# Patient Record
Sex: FEMALE | Race: WHITE | NOT HISPANIC OR LATINO | Employment: OTHER | URBAN - METROPOLITAN AREA
[De-identification: names, ages, dates, MRNs, and addresses within clinical notes are randomized per-mention and may not be internally consistent; named-entity substitution may affect disease eponyms.]

---

## 2018-08-09 ENCOUNTER — OFFICE VISIT CONVERTED (OUTPATIENT)
Dept: PODIATRY | Facility: CLINIC | Age: 83
End: 2018-08-09
Attending: PODIATRIST

## 2019-01-25 ENCOUNTER — OFFICE VISIT (OUTPATIENT)
Dept: ORTHOPEDIC SURGERY | Facility: CLINIC | Age: 84
End: 2019-01-25

## 2019-01-25 VITALS — TEMPERATURE: 97.3 F | BODY MASS INDEX: 26.91 KG/M2 | HEIGHT: 64 IN | WEIGHT: 157.6 LBS

## 2019-01-25 DIAGNOSIS — S52.501A CLOSED FRACTURE OF DISTAL END OF RIGHT RADIUS, UNSPECIFIED FRACTURE MORPHOLOGY, INITIAL ENCOUNTER: ICD-10-CM

## 2019-01-25 PROCEDURE — 99213 OFFICE O/P EST LOW 20 MIN: CPT | Performed by: ORTHOPAEDIC SURGERY

## 2019-01-25 PROCEDURE — 25600 CLTX DST RDL FX/EPHYS SEP WO: CPT | Performed by: ORTHOPAEDIC SURGERY

## 2019-01-25 RX ORDER — MELOXICAM 7.5 MG/1
TABLET ORAL
COMMUNITY
Start: 2019-01-18 | End: 2020-06-05

## 2019-01-25 RX ORDER — ROSUVASTATIN CALCIUM 5 MG/1
TABLET, COATED ORAL
COMMUNITY
Start: 2019-01-08 | End: 2020-06-05

## 2019-01-25 RX ORDER — KETOROLAC TROMETHAMINE 10 MG/1
TABLET, FILM COATED ORAL
COMMUNITY
Start: 2019-01-25 | End: 2019-06-24

## 2019-01-25 NOTE — PROGRESS NOTES
Chief Complaint   Patient presents with   • Right Wrist - Pain             HPI  87 year old female presents with right wrist pain following a fall in the living room of her own home yesterday.  She was seen at [facility] and sent here for treatment of a fracture.  X-rays were performed there.  She has developed significant swelling and bruising on the dorsal aspect of the wrist.  She has difficulty with making a fist.  She does not have an obvious clinical deformity.  Skin and soft tissue swelling is consistent with a fracture of the distal radius.  There is no evidence of a compartmental syndrome.  She states that she has been using Tylenol for the pain which is constantly present.  She describes her pain as a constant dull ache which is made worse by movement of her fingers.  She has been diagnosed with a comminuted distal radius fracture and referred to our office for further management.          Allergies   Allergen Reactions   • Ciprofloxacin Rash   • Penicillins Rash         Social History     Socioeconomic History   • Marital status:      Spouse name: Not on file   • Number of children: Not on file   • Years of education: Not on file   • Highest education level: Not on file   Social Needs   • Financial resource strain: Not on file   • Food insecurity - worry: Not on file   • Food insecurity - inability: Not on file   • Transportation needs - medical: Not on file   • Transportation needs - non-medical: Not on file   Occupational History   • Not on file   Tobacco Use   • Smoking status: Never Smoker   Substance and Sexual Activity   • Alcohol use: No     Frequency: Never   • Drug use: Not on file   • Sexual activity: Not on file   Other Topics Concern   • Not on file   Social History Narrative   • Not on file       History reviewed. No pertinent family history.    Past Surgical History:   Procedure Laterality Date   • HERNIA REPAIR     • JOINT REPLACEMENT     • KNEE SURGERY Bilateral     knee replacement        History reviewed. No pertinent past medical history.        Vitals:    01/25/19 1048   Temp: 97.3 °F (36.3 °C)             Review of Systems        Physical Exam            Joint/Body Part Specific Exam:  right wrist. Patient is right hand dominant. The distal radius is swollen. Skin and soft tissues are bruised. There is some shortening of the distal radius compared to the distal ulna. There is an angular tilt with disorientation of the articular surface which is not pointed in its normal volar inclination. Length of the radius is reduced compared to the distal ulna. There is tenderness consistent with a fracture of the distal radial metaphysis. Wrist range of motion is significantly compromised because of pain swelling. The ulnar styloid process is also somewhat tender. There appears to be a soft tissue injury to the medial side of the wrist as well.     Capillary refill is 2 seconds with a brisk return. Radial artery pulses are palpable but slightly thin and thready, most likely due to swelling locally. Median nerve function is intact, but the patient does complain of some tingling and paresthesia along the median nerve distribution. Patient is unable to make a clenched fist and motion at the elbow bothers the patient significantly. Pain is 6 on a scale of 1-10.          X-RAY Report: X-ray reports from an outside facility are reviewed.  There is a fracture of the distal radius with intra-articular extension.  Slight shortening of the radius is noted with respect to the ulna.  There is osteopenia in the metaphyseal bone.  The decision to proceed with nonoperative care is based on the x-ray findings and the desires of the patient not to undergo surgery.            Diagnostics:            Macie was seen today for pain.    Diagnoses and all orders for this visit:    Closed fracture of distal end of right radius, unspecified fracture morphology, initial encounter            Procedures          I provided this  patient with educational materials regarding bone health and cast or splint care.        Plan: Operative versus nonoperative management of this fracture discussed with the patient.  She states that she is 87 years old and really does not want to undergo any form of surgical intervention if she can help it.    Elevation to control swelling.    Application of a short arm cast with the fingers in an intrinsic plus position.    Neurovascular checks.    Cast care.    Tablet Ultram 50 g tab 1 by mouth twice a day for pain swelling and discomfort.    tablet tylenol extra strength tab 1 by mouth every 6 intermittently for pain swelling and discomfort.    follow-up in my office in 4 weeks for removal of the cast and repeat the x-rays.    Controlled substance treatment options discussed in detail. Patient's signed consent to medical options on file. PUSHPA form in chart.  The patient is being provided this narcotic prescription to address the acute medical condition that they are undergoing/experiencing at this time.  It is my medical and surgical assessment that more than 3 days of narcotic medication is necessary to help control the pain and discomfort that this patient is experiencing at this point.  Risks of narcotic medication usage outlined.  Possibility of physical and psychological dependence and abuse, especially long term, emphasized to the patient.  I have explained to the patient that we will try to wean them off the narcotic medication as soon as possible and introduce non-narcotic modalities of pain control.  At this point in the patient's clinical spectrum, however, alternative available treatments are inadequate to control their pain and symptoms.  I have also discussed the possibility of random drug testing as well as pill counts to prevent misuse and misappropriation of the narcotic medications prescribed to the patient.          CC To Beth Lopez APRN

## 2019-01-28 NOTE — TELEPHONE ENCOUNTER
"PATIENT IS REQUESTING SOMETHING FOR PAIN. SHE STATES THAT IT DOES NOT EVEN HAVE TO BE A NARCOTIC IF YOU WOULD WANT TO GIVE HER A \"STRONG TYLENOL/IBUPROFEN\"  "

## 2019-01-29 RX ORDER — TRAMADOL HYDROCHLORIDE 50 MG/1
TABLET ORAL
Qty: 30 TABLET | Refills: 0 | OUTPATIENT
Start: 2019-01-29 | End: 2019-09-17

## 2019-01-29 NOTE — TELEPHONE ENCOUNTER
Rx has been called in. Waiting for Dr. Landaverde to electronically sign. Patient notified via voicemail.

## 2019-02-25 ENCOUNTER — OFFICE VISIT (OUTPATIENT)
Dept: ORTHOPEDIC SURGERY | Facility: CLINIC | Age: 84
End: 2019-02-25

## 2019-02-25 VITALS — TEMPERATURE: 97.1 F

## 2019-02-25 DIAGNOSIS — S52.501D CLOSED FRACTURE OF DISTAL END OF RIGHT RADIUS WITH ROUTINE HEALING, UNSPECIFIED FRACTURE MORPHOLOGY, SUBSEQUENT ENCOUNTER: Primary | ICD-10-CM

## 2019-02-25 PROCEDURE — 73100 X-RAY EXAM OF WRIST: CPT | Performed by: PHYSICIAN ASSISTANT

## 2019-02-25 PROCEDURE — 99024 POSTOP FOLLOW-UP VISIT: CPT | Performed by: PHYSICIAN ASSISTANT

## 2019-02-25 NOTE — PROGRESS NOTES
OTHER POST OP GLOBAL     Macie Miller:  ?  2/13/1931:  ?  Chief Complaint   Patient presents with   • Right Wrist - Follow-up   • Cast Removal     ?  HPI:   Patient returns today for 4 week(s) follow up of right Distal radius fracture. Patient reports doing well with no unusual complaints.  She denies pain in the extremity.  Appears to be progressing appropriately.  Patient is right-hand dominant. she does report pain in the left hip/low back region since her fall a month ago.  Her PCP ordered x-rays 5 days ago and she reports they were normal.       Review of Systems      Ortho Exam:   Right wrist. There is no clinical deformity at this point. The distal radius fracture appears to be appropriately stabilized. Neurovascular status is intact. Radial artery function is maintained. Capillary refill is 2 seconds with a brisk return. Median nerve function is well preserved. Length of the radius is appropriate compared to the distal ulna. There is no instability of the DRUJ.  There is decreased range of motion at the wrist joint.  Supination is 0-70 degrees.  Flexion is 0-55 degrees.  Pronation is 0-90 degrees.  The forearm muscles are soft and nontender. No evidence of RSD or any other untoward surgical complications are noted.    Capillary refill is 2 seconds with a brisk return. Radial artery pulses are palpable but slightly thin and thready, most likely due to swelling locally. Median nerve function is intact, but the patient does complain of some tingling and paresthesia along the median nerve distribution. Patient is unable to make a clenched fist and motion at the elbow bothers the patient significantly. Pain is 0 on a scale of 1-10.     Diagnostic Studies:  X-Ray Report:  Right wrist(s) X-Ray  Indication: Evaluation of healing distal radius fracture  AP, Lateral views  Findings: Healing distal radius fracture with calcium deposits over the fracture line  Bony lesion: no  Soft tissues: within normal  limits  Joint spaces: within normal limits  Hardware appropriately positioned: not applicable    Prior studies available for comparison: yes     X-RAY was ordered and reviewed by Jaiden Seo PA-C    Assessment:  Macie was seen today for cast removal and follow-up.    Diagnoses and all orders for this visit:    Closed fracture of distal end of right radius with routine healing, unspecified fracture morphology, subsequent encounter  -     XR Wrist 2 View Right          Plan   Cast removed and EXO brace applied  Continue ice as needed  Continue to work hand with a soft ball or play-eric  Alternate Ibuprofen and Tylenol as needed  Restrictions: No heavy lifting, pushing or pulling  Follow up in 4 week(s) with Xrays of: wrist.  If her left hip area is still bothersome at next visit we could look at possibly doing an injection of steroid.  I explained that we could not do that today because she is still too early in the healing process with the radial fracture and administration of steroid would slow bone healing.        Jaiden Seo PA-C  2/25/2019

## 2019-03-26 ENCOUNTER — OFFICE VISIT (OUTPATIENT)
Dept: ORTHOPEDIC SURGERY | Facility: CLINIC | Age: 84
End: 2019-03-26

## 2019-03-26 VITALS — HEIGHT: 63 IN | TEMPERATURE: 96.8 F | WEIGHT: 156 LBS | BODY MASS INDEX: 27.64 KG/M2

## 2019-03-26 DIAGNOSIS — S52.521D CLOSED TORUS FRACTURE OF DISTAL END OF RIGHT RADIUS WITH ROUTINE HEALING, SUBSEQUENT ENCOUNTER: Primary | ICD-10-CM

## 2019-03-26 DIAGNOSIS — S79.912A HIP INJURY, LEFT, INITIAL ENCOUNTER: ICD-10-CM

## 2019-03-26 PROCEDURE — 99024 POSTOP FOLLOW-UP VISIT: CPT | Performed by: ORTHOPAEDIC SURGERY

## 2019-03-26 PROCEDURE — 73502 X-RAY EXAM HIP UNI 2-3 VIEWS: CPT | Performed by: ORTHOPAEDIC SURGERY

## 2019-03-26 PROCEDURE — 73100 X-RAY EXAM OF WRIST: CPT | Performed by: ORTHOPAEDIC SURGERY

## 2019-04-13 PROBLEM — S79.912A HIP INJURY, LEFT, INITIAL ENCOUNTER: Status: ACTIVE | Noted: 2019-04-13

## 2019-05-02 ENCOUNTER — OFFICE VISIT (OUTPATIENT)
Dept: ORTHOPEDIC SURGERY | Facility: CLINIC | Age: 84
End: 2019-05-02

## 2019-05-02 DIAGNOSIS — G56.01 CARPAL TUNNEL SYNDROME OF RIGHT WRIST: ICD-10-CM

## 2019-05-02 DIAGNOSIS — S62.101D CLOSED FRACTURE OF RIGHT WRIST WITH ROUTINE HEALING, SUBSEQUENT ENCOUNTER: Primary | ICD-10-CM

## 2019-05-02 DIAGNOSIS — M18.11 ARTHRITIS OF CARPOMETACARPAL (CMC) JOINT OF RIGHT THUMB: ICD-10-CM

## 2019-05-02 PROCEDURE — 20600 DRAIN/INJ JOINT/BURSA W/O US: CPT | Performed by: ORTHOPAEDIC SURGERY

## 2019-05-02 PROCEDURE — 73100 X-RAY EXAM OF WRIST: CPT | Performed by: ORTHOPAEDIC SURGERY

## 2019-05-02 PROCEDURE — 99213 OFFICE O/P EST LOW 20 MIN: CPT | Performed by: ORTHOPAEDIC SURGERY

## 2019-05-02 RX ADMIN — METHYLPREDNISOLONE ACETATE 80 MG: 80 INJECTION, SUSPENSION INTRA-ARTICULAR; INTRALESIONAL; INTRAMUSCULAR; SOFT TISSUE at 10:09

## 2019-05-02 RX ADMIN — LIDOCAINE HYDROCHLORIDE 1 ML: 10 INJECTION, SOLUTION INFILTRATION; PERINEURAL at 10:09

## 2019-05-02 NOTE — PROGRESS NOTES
FOLLOW UP VISIT    Patient: Macie Miller  ?  YOB: 1931  ?  Chief Complaint   Patient presents with   • Right Wrist - Follow-up      ?  HPI: Patient is following up on a right distal radius fracture which has healed nicely.  She has some numbness and tingling related to median nerve function.  She states that her right thumb is hurting her very significantly.  She has advanced amounts of CMC joint arthritis at the base of the thumb.  Pain Location: ..Base of the right thumb over the CMC joint.  Radiation: none  Quality: sharp and stabbing  Intensity/Severity: severe  Duration: For the past 2 to 3 weeks.  Onset quality: sudden  Timing: intermittent  Aggravating Factors: Active mobilization of the thumb  Alleviating Factors: Resting the thumb and using a brace.  Previous Episodes: none mentioned  Associated Symptoms: pain, swelling, decreased ROM  Previous Treatment: Anti-inflammatory medication.    This patient is an established patient.  This problem is not new to this examiner.      Allergies:   Allergies   Allergen Reactions   • Ciprofloxacin Rash   • Penicillins Rash       Medications:   Home Medications:  Current Outpatient Medications on File Prior to Visit   Medication Sig   • ketorolac (TORADOL) 10 MG tablet    • meloxicam (MOBIC) 7.5 MG tablet    • rosuvastatin (CRESTOR) 5 MG tablet    • traMADol (ULTRAM) 50 MG tablet Take one tablet by mouth every 8-12 hours prn pain     No current facility-administered medications on file prior to visit.      Current Medications:  Scheduled Meds:  PRN Meds:.    I have reviewed the patient's medical history in detail and updated the computerized patient record.  Review and summarization of old records include:    History reviewed. No pertinent past medical history.  Past Surgical History:   Procedure Laterality Date   • HERNIA REPAIR     • JOINT REPLACEMENT     • KNEE SURGERY Bilateral     knee replacement     Social History     Occupational History   •  "Not on file   Tobacco Use   • Smoking status: Never Smoker   Substance and Sexual Activity   • Alcohol use: No     Frequency: Never   • Drug use: Not on file   • Sexual activity: Not on file      Social History     Social History Narrative   • Not on file     History reviewed. No pertinent family history.      Review of Systems   Constitutional: Negative.    HENT: Negative.    Eyes: Negative.    Respiratory: Negative.    Cardiovascular: Negative.    Gastrointestinal: Negative.    Endocrine: Negative.    Genitourinary: Negative.    Musculoskeletal: Positive for joint swelling.   Skin: Negative.    Allergic/Immunologic: Negative.    Neurological: Negative.    Hematological: Negative.    Psychiatric/Behavioral: Negative.           Wt Readings from Last 3 Encounters:   03/26/19 70.8 kg (156 lb)   01/25/19 71.5 kg (157 lb 9.6 oz)     Ht Readings from Last 3 Encounters:   03/26/19 160 cm (63\")   01/25/19 161.3 cm (63.5\")     There is no height or weight on file to calculate BMI.  No height and weight on file for this encounter.  There were no vitals filed for this visit.      Physical Exam   Constitutional: Patient is oriented to person, place, and time. Appears well-developed and well-nourished.   HENT:   Head: Normocephalic and atraumatic.   Eyes: Conjunctivae and EOM are normal. Pupils are equal, round, and reactive to light.   Cardiovascular: Normal rate, regular rhythm, normal heart sounds and intact distal pulses.   Pulmonary/Chest: Effort normal and breath sounds normal.   Musculoskeletal:   See detailed exam below   Neurological: Alert and oriented to person, place, and time. No sensory deficit. Coordination normal.   Skin: Skin is warm and dry. Capillary refill takes less than 2 seconds. No rash noted. No erythema.   Psychiatric: Patient has a normal mood and affect. Her behavior is normal. Judgment and thought content normal.   Nursing note and vitals reviewed.    Ortho Exam:   Right wrist-carpal tunnel. The " patient is right hand dominant. The Skin is mildly swollen volarly over the proximal crease of the wrist. Radial pulse is palpable. Capillary refill is 2 seconds with a brisk return. Positive Tinel's sign at the wrist. Positive Phalen's sign at the wrist .  strength is impaired.  There is no muscle wasting or atrophy specifically involving the thenar muscle group.  Sensation to light touch and pinprick are diminished over the thumb, index and middle fingers.  Flexor and extensor tendon functions are well preserved. Moving 2 point discrimination is prolonged to 12mm over the median nerve distribution. No evidence of RSD.  There is no clinical evidence of renal disease, thyroid disease or any generalized neurological condition that could be causing nerve dysfunction.    Right hand. Tenderness is present at base of the 1st metacarpal. Skin and soft tissues are mildly swollen. Flexor and extensor tendon function is well preserved. Capillary refill is 2 seconds with a brisk return. Axial lading of the joint causes crepitus and pain. Pinch strength is compromised. Slight subluxation of the 1st metacarpal base is noted. Neurovascular status is intact.      Diagnostics:right Wrist X-Ray  Indication: Evaluation of healing of a distal radius fracture along with the base of the thumb.  AP, Lateral views  Findings: Advanced degenerative change over the CMC joint of the thumb and a healed distal radius fracture.  no bony lesion  Soft tissues within normal limits  decreased joint spaces  Hardware appropriately positioned not applicable      yes prior studies available for comparison.    X-RAY was ordered and reviewed by Johnathan Landaverde MD       Assessment:  Macie was seen today for follow-up.    Diagnoses and all orders for this visit:    Closed fracture of right wrist with routine healing, subsequent encounter  -     XR Wrist 2 View Right    Arthritis of carpometacarpal (CMC) joint of right thumb  -     Small Joint  Arthrocentesis: R thumb CMC    Carpal tunnel syndrome of right wrist          Small Joint Arthrocentesis: R thumb CMC  Consent given by: patient  Site marked: site marked  Timeout: Immediately prior to procedure a time out was called to verify the correct patient, procedure, equipment, support staff and site/side marked as required   Supporting Documentation  Indications: pain   Procedure Details  Location: thumb - R thumb CMC  Preparation: Patient was prepped and draped in the usual sterile fashion  Needle size: 27 G  Medications administered: 1 mL lidocaine 1 %; 80 mg methylPREDNISolone acetate 80 MG/ML  Patient tolerance: patient tolerated the procedure well with no immediate complications        ?    Plan  Injected patient's right thumb with a steroid over the dorsal aspect of the CMC joint.  · Compression/brace  · Rest, ice, compression, and elevation (RICE) therapy  · Stretching and strengthening exercises of the wrist joint.  · Use a supportive brace to minimize pressure over the median nerve and to decrease the symptoms of carpal tunnel compression.  · Discussed with the patient and her daughter that we would like to get an EMG/nerve conduction study to see the extent of damage to the median nerve.  She might require surgical decompression of this nerve.  · Falls precautions.  · Calcium and vitamin D for bone health.  · Avoid repetitive loading and lifting with this wrist to minimize the pressure over the median nerve.  · Vitamin B6, B12 100 mcg tab 1 p.o. daily for nerve function improvement.  · OTC Tylenol 1000mg by mouth every 4-6 hours as needed for pain   · Follow up in 3 month(s)    Johnathan Landaverde MD  05/02/2019

## 2019-05-14 PROBLEM — G56.01 CARPAL TUNNEL SYNDROME OF RIGHT WRIST: Status: ACTIVE | Noted: 2019-05-14

## 2019-05-14 PROBLEM — M18.11 ARTHRITIS OF CARPOMETACARPAL (CMC) JOINT OF RIGHT THUMB: Status: ACTIVE | Noted: 2019-05-14

## 2019-05-14 PROBLEM — S62.101A CLOSED FRACTURE OF RIGHT WRIST: Status: ACTIVE | Noted: 2019-05-14

## 2019-05-14 RX ORDER — METHYLPREDNISOLONE ACETATE 80 MG/ML
80 INJECTION, SUSPENSION INTRA-ARTICULAR; INTRALESIONAL; INTRAMUSCULAR; SOFT TISSUE
Status: COMPLETED | OUTPATIENT
Start: 2019-05-02 | End: 2019-05-02

## 2019-05-14 RX ORDER — LIDOCAINE HYDROCHLORIDE 10 MG/ML
1 INJECTION, SOLUTION INFILTRATION; PERINEURAL
Status: COMPLETED | OUTPATIENT
Start: 2019-05-02 | End: 2019-05-02

## 2019-06-24 ENCOUNTER — OFFICE VISIT (OUTPATIENT)
Dept: ORTHOPEDIC SURGERY | Facility: CLINIC | Age: 84
End: 2019-06-24

## 2019-06-24 VITALS — BODY MASS INDEX: 27.61 KG/M2 | WEIGHT: 155.8 LBS | TEMPERATURE: 97.8 F | HEIGHT: 63 IN

## 2019-06-24 DIAGNOSIS — S62.356A CLOSED NONDISPLACED FRACTURE OF SHAFT OF FIFTH METACARPAL BONE OF RIGHT HAND, INITIAL ENCOUNTER: ICD-10-CM

## 2019-06-24 DIAGNOSIS — S69.91XA INJURY OF RIGHT WRIST, INITIAL ENCOUNTER: Primary | ICD-10-CM

## 2019-06-24 DIAGNOSIS — Z78.0 POSTMENOPAUSAL: ICD-10-CM

## 2019-06-24 DIAGNOSIS — S62.304A CLOSED NONDISPLACED FRACTURE OF FOURTH METACARPAL BONE OF RIGHT HAND, UNSPECIFIED PORTION OF METACARPAL, INITIAL ENCOUNTER: ICD-10-CM

## 2019-06-24 DIAGNOSIS — S62.101D CLOSED FRACTURE OF RIGHT WRIST WITH ROUTINE HEALING, SUBSEQUENT ENCOUNTER: ICD-10-CM

## 2019-06-24 PROBLEM — S62.309A: Status: ACTIVE | Noted: 2019-06-24

## 2019-06-24 PROCEDURE — 99214 OFFICE O/P EST MOD 30 MIN: CPT | Performed by: PHYSICIAN ASSISTANT

## 2019-06-24 PROCEDURE — 26740 TREAT FINGER FRACTURE EACH: CPT | Performed by: PHYSICIAN ASSISTANT

## 2019-06-24 PROCEDURE — 73100 X-RAY EXAM OF WRIST: CPT | Performed by: PHYSICIAN ASSISTANT

## 2019-06-24 PROCEDURE — 26600 TREAT METACARPAL FRACTURE: CPT | Performed by: PHYSICIAN ASSISTANT

## 2019-06-24 RX ORDER — MONTELUKAST SODIUM 10 MG/1
TABLET ORAL
COMMUNITY
Start: 2019-06-03 | End: 2020-06-05

## 2019-06-24 RX ORDER — PRAVASTATIN SODIUM 20 MG
TABLET ORAL
COMMUNITY
End: 2020-06-05

## 2019-06-24 RX ORDER — HYDROCHLOROTHIAZIDE 25 MG/1
12.5 TABLET ORAL DAILY
Refills: 1 | COMMUNITY
Start: 2019-05-21 | End: 2020-06-05

## 2019-06-24 RX ORDER — ESCITALOPRAM OXALATE 10 MG/1
TABLET ORAL
COMMUNITY
End: 2020-06-05

## 2019-06-24 RX ORDER — TRAZODONE HYDROCHLORIDE 50 MG/1
TABLET ORAL
COMMUNITY

## 2019-06-24 RX ORDER — LIDOCAINE 50 MG/G
OINTMENT TOPICAL
COMMUNITY
End: 2020-06-05

## 2019-06-24 RX ORDER — EPINEPHRINE 0.3 MG/.3ML
INJECTION SUBCUTANEOUS
COMMUNITY
End: 2020-06-05

## 2019-06-24 RX ORDER — TRIAMCINOLONE ACETONIDE 1 MG/G
CREAM TOPICAL
COMMUNITY

## 2019-06-24 RX ORDER — DICLOFENAC SODIUM 75 MG/1
TABLET, DELAYED RELEASE ORAL
COMMUNITY
End: 2020-06-05

## 2019-06-24 RX ORDER — DOXAZOSIN 8 MG/1
8 TABLET ORAL DAILY
Refills: 1 | COMMUNITY
Start: 2019-05-21 | End: 2020-06-05

## 2019-06-24 RX ORDER — METFORMIN HYDROCHLORIDE 500 MG/1
TABLET, EXTENDED RELEASE ORAL
COMMUNITY
Start: 2019-04-03 | End: 2020-06-05

## 2019-06-24 RX ORDER — ALBUTEROL SULFATE 90 UG/1
AEROSOL, METERED RESPIRATORY (INHALATION)
COMMUNITY
End: 2020-06-05

## 2019-07-09 DIAGNOSIS — Z78.0 POSTMENOPAUSAL: ICD-10-CM

## 2019-07-09 DIAGNOSIS — S62.101D CLOSED FRACTURE OF RIGHT WRIST WITH ROUTINE HEALING, SUBSEQUENT ENCOUNTER: ICD-10-CM

## 2019-07-09 DIAGNOSIS — S62.309A MULTIPLE CLOSED FRACTURES OF SINGLE METACARPAL BONE, INITIAL ENCOUNTER: ICD-10-CM

## 2019-07-19 ENCOUNTER — OFFICE VISIT (OUTPATIENT)
Dept: ORTHOPEDIC SURGERY | Facility: CLINIC | Age: 84
End: 2019-07-19

## 2019-07-19 DIAGNOSIS — S62.642D CLOSED NONDISPLACED FRACTURE OF PROXIMAL PHALANX OF RIGHT MIDDLE FINGER WITH ROUTINE HEALING, SUBSEQUENT ENCOUNTER: ICD-10-CM

## 2019-07-19 DIAGNOSIS — M80.00XA OSTEOPOROSIS WITH CURRENT PATHOLOGICAL FRACTURE, UNSPECIFIED OSTEOPOROSIS TYPE, INITIAL ENCOUNTER: ICD-10-CM

## 2019-07-19 DIAGNOSIS — S62.304A CLOSED NONDISPLACED FRACTURE OF FOURTH METACARPAL BONE OF RIGHT HAND, UNSPECIFIED PORTION OF METACARPAL, INITIAL ENCOUNTER: Primary | ICD-10-CM

## 2019-07-19 DIAGNOSIS — S62.309D: ICD-10-CM

## 2019-07-19 DIAGNOSIS — S62.356D CLOSED NONDISPLACED FRACTURE OF SHAFT OF FIFTH METACARPAL BONE OF RIGHT HAND WITH ROUTINE HEALING, SUBSEQUENT ENCOUNTER: ICD-10-CM

## 2019-07-19 DIAGNOSIS — Z51.81 MEDICATION MONITORING ENCOUNTER: ICD-10-CM

## 2019-07-19 PROBLEM — S62.642A CLOSED NONDISPLACED FRACTURE OF PROXIMAL PHALANX OF RIGHT MIDDLE FINGER: Status: ACTIVE | Noted: 2019-07-19

## 2019-07-19 PROBLEM — S62.356A CLOSED NONDISPLACED FRACTURE OF SHAFT OF FIFTH METACARPAL BONE OF RIGHT HAND: Status: ACTIVE | Noted: 2019-07-19

## 2019-07-19 PROCEDURE — 99024 POSTOP FOLLOW-UP VISIT: CPT | Performed by: PHYSICIAN ASSISTANT

## 2019-07-19 PROCEDURE — 73120 X-RAY EXAM OF HAND: CPT | Performed by: PHYSICIAN ASSISTANT

## 2019-07-19 NOTE — PROGRESS NOTES
FOLLOW UP VISIT    Patient: Macie Miller  ?  YOB: 1931    MRN: [id number]  ?  Chief Complaint   Patient presents with   • Right Wrist - Follow-up   • Cast Removal      ?  HPI:   Patient returns today for 4 week(s) follow up of right Intra-articular avulsion fracture at proximal third phalanx, distal end of fourth metacarpal fracture and fracture of fifth midshaft metacarpal. Patient reports doing well with no unusual complaints. Appears to be progressing appropriately.  She does report an increase in bruising as well as stiffness in her right hand.  Patient also presents today for follow-up of DEXA scan results.    This patient is an established patient.  This problem is not new to this examiner.      Allergies:   Allergies   Allergen Reactions   • Ciprofloxacin Rash   • Penicillins Rash       Medications:   Home Medications:  Current Outpatient Medications on File Prior to Visit   Medication Sig   • albuterol sulfate HFA (VENTOLIN HFA) 108 (90 Base) MCG/ACT inhaler Ventolin HFA 90 mcg/actuation aerosol inhaler   • diclofenac (VOLTAREN) 75 MG EC tablet diclofenac sodium 75 mg tablet,delayed release   • doxazosin (CARDURA) 8 MG tablet Take 8 mg by mouth Daily.   • EPINEPHrine (EPIPEN) 0.3 MG/0.3ML solution auto-injector injection epinephrine 0.3 mg/0.3 mL injection, auto-injector   • escitalopram (LEXAPRO) 10 MG tablet escitalopram 10 mg tablet   • Fluticasone Furoate-Vilanterol (BREO ELLIPTA) 200-25 MCG/INH inhaler Breo Ellipta 200 mcg-25 mcg/dose powder for inhalation   • hydrochlorothiazide (HYDRODIURIL) 25 MG tablet Take 12.5 mg by mouth Daily.   • lidocaine (XYLOCAINE) 5 % ointment lidocaine 5 % topical ointment   APPLY TO AFFECTED AREA(S) BY TOPICAL ROUTE 1-4 TIMES DAILY AS NEEDED   • meloxicam (MOBIC) 7.5 MG tablet    • metFORMIN ER (GLUCOPHAGE-XR) 500 MG 24 hr tablet    • montelukast (SINGULAIR) 10 MG tablet    • POLYETHYLENE GLYCOL 3350 PO polyethylene glycol 3350 17 gram/dose oral  "powder   • pravastatin (PRAVACHOL) 20 MG tablet pravastatin 20 mg tablet   • PROCTOZONE-HC 2.5 % rectal cream USE AS NEEDED FOR HEMORRHOIDS   • rosuvastatin (CRESTOR) 5 MG tablet    • sertraline (ZOLOFT) 50 MG tablet sertraline 50 mg tablet   • traMADol (ULTRAM) 50 MG tablet Take one tablet by mouth every 8-12 hours prn pain   • traZODone (DESYREL) 50 MG tablet trazodone 50 mg tablet   • triamcinolone (KENALOG) 0.1 % cream triamcinolone acetonide 0.1 % topical cream     No current facility-administered medications on file prior to visit.      Current Medications:  Scheduled Meds:  PRN Meds:.    I have reviewed the patient's medical history in detail and updated the computerized patient record.  Review and summarization of old records include:    History reviewed. No pertinent past medical history.  Past Surgical History:   Procedure Laterality Date   • HERNIA REPAIR     • JOINT REPLACEMENT     • KNEE SURGERY Bilateral     knee replacement     Social History     Occupational History   • Not on file   Tobacco Use   • Smoking status: Never Smoker   Substance and Sexual Activity   • Alcohol use: No     Frequency: Never   • Drug use: Not on file   • Sexual activity: Not on file      Social History     Social History Narrative   • Not on file     History reviewed. No pertinent family history.      Review of Systems   Constitutional: Negative.    Eyes: Negative.    Respiratory: Negative.    Cardiovascular: Negative.    Endocrine: Negative.    Musculoskeletal: Positive for arthralgias and joint swelling.   Skin: Positive for color change.   Allergic/Immunologic: Negative.    Neurological: Negative.    Hematological: Negative.    Psychiatric/Behavioral: Negative.           Wt Readings from Last 3 Encounters:   06/24/19 70.7 kg (155 lb 12.8 oz)   03/26/19 70.8 kg (156 lb)   01/25/19 71.5 kg (157 lb 9.6 oz)     Ht Readings from Last 3 Encounters:   06/24/19 160 cm (63\")   03/26/19 160 cm (63\")   01/25/19 161.3 cm (63.5\") "     There is no height or weight on file to calculate BMI.  No height and weight on file for this encounter.  There were no vitals filed for this visit.      Physical Exam  Constitutional: Patient is oriented to person, place, and time. Appears well-developed and well-nourished.   HENT:   Head: Normocephalic and atraumatic.   Eyes: Conjunctivae and EOM are normal. Pupils are equal, round, and reactive to light.   Cardiovascular: Normal rate, regular rhythm, normal heart sounds and intact distal pulses.   Pulmonary/Chest: Effort normal and breath sounds normal.   Musculoskeletal:   See detailed exam below   Neurological: Alert and oriented to person, place, and time. No sensory deficit. Coordination normal.   Skin: Skin is warm and dry. Capillary refill takes less than 2 seconds. No rash noted. No erythema. There is significant bruising on the palmar aspect of the right hand over the fractured areas.  Psychiatric: Patient has a normal mood and affect. Her behavior is normal. Judgment and thought content normal.   Nursing note and vitals reviewed.      Ortho Exam:   Right hand-metacarpal fracture. Patient is right dominant. There is a significant amount of soft tissue swelling both on the dorsal and volar aspects of the 3rd, 4th and 5th metacarpals. There is slight apex dorsal angulation over the 5th metcarpal with tenderness appropriate for the fracture. There is minimal tenderness over the MCP joints of the 3rd and 4th phalanges. Skin and soft tissue are swollen but much improved from last visit. Capillary refill is 2 seconds with a brisk return. Cascade of the fingers is fairly well preserved. Patient is unable to make a fist because of stiffness caused by being in a cast. There is no evidence of a compartmental syndrome. Skin and soft tissue envelop is clocked but essentially bruised. There is no crossover deformity of the digit distally.      Diagnostics:  X-Ray Report:  Right Hand X-Ray  Indication: Evaluation  of healing of multiple fractures  AP, Lateral views  Findings: Appropriate healing and callus formation of right proximal third phalanx, fracture of distal end of fourth metacarpal and fifth midshaft metacarpal fracture  Bony lesion: no  Soft tissues: increased  Joint spaces: within normal limits  Hardware appropriately positioned: not applicable  Prior studies available for comparison: yes   X-RAY was ordered and reviewed by Jaiden Seo PA-C      Assessment:  Macie was seen today for cast removal and follow-up.    Diagnoses and all orders for this visit:    Closed nondisplaced fracture of fourth metacarpal bone of right hand, unspecified portion of metacarpal, initial encounter  -     Ambulatory Referral to Occupational Therapy  -     Romosozumab-aqqg (EVENITY) 105 MG/1.17ML solution prefilled syringe; Inject 210 mg under the skin into the appropriate area as directed Every 30 (Thirty) Days for 336 days.    Multiple closed fractures of metacarpal bones, with routine healing, subsequent encounter  -     XR Hand 2 View Right  -     Romosozumab-aqqg (EVENITY) 105 MG/1.17ML solution prefilled syringe; Inject 210 mg under the skin into the appropriate area as directed Every 30 (Thirty) Days for 336 days.    Closed nondisplaced fracture of shaft of fifth metacarpal bone of right hand with routine healing, subsequent encounter  -     Ambulatory Referral to Occupational Therapy  -     Romosozumab-aqqg (EVENITY) 105 MG/1.17ML solution prefilled syringe; Inject 210 mg under the skin into the appropriate area as directed Every 30 (Thirty) Days for 336 days.    Closed nondisplaced fracture of proximal phalanx of right middle finger with routine healing, subsequent encounter  -     Ambulatory Referral to Occupational Therapy  -     Romosozumab-aqqg (EVENITY) 105 MG/1.17ML solution prefilled syringe; Inject 210 mg under the skin into the appropriate area as directed Every 30 (Thirty) Days for 336 days.    Osteoporosis  with current pathological fracture, unspecified osteoporosis type, initial encounter  -     Romosozumab-aqqg (EVENITY) 105 MG/1.17ML solution prefilled syringe; Inject 210 mg under the skin into the appropriate area as directed Every 30 (Thirty) Days for 336 days.  -     Calcium; Future    Medication monitoring encounter  -     Calcium; Future      ?    Plan    · Cast was removed today and patient applied a carpal tunnel brace per the instruction of Dr. Landaverde  · Patient is to begin occupational therapy  · Rest, ice, compression, and elevation (RICE) therapy  · Stretching and strengthening exercises  · OTC Tylenol 500-1000mg by mouth every 6 hours as needed for pain   · Follow up in 4 week(s)  · Discussed results of DEXA scan with patient and her daughter as well as treatment options.  I advised patient I would like to start her on the monthly subcutaneous injections of Evenity for the osteoporosis. I explained to patient and daughter that although her DEXA scan reads that she has osteopenia, she will actually have a diagnosis of osteoporosis due to the dexa scan combined with 2 fragility fractures occurring this year. Patient and family verbalized understanding and patient agrees to do the treatment I am recommended.   · Time spent with patient: 12 minutes face-to-face with greater than 50% spent in counseling and coordination of care discussing patient's dexa scan results and treatment options for osteoporosis.    Date of encounter: 07/19/2019   Jaiden Seo PA-C

## 2019-07-29 ENCOUNTER — TELEPHONE (OUTPATIENT)
Dept: ORTHOPEDIC SURGERY | Facility: CLINIC | Age: 84
End: 2019-07-29

## 2019-07-29 DIAGNOSIS — S62.642D CLOSED NONDISPLACED FRACTURE OF PROXIMAL PHALANX OF RIGHT MIDDLE FINGER WITH ROUTINE HEALING, SUBSEQUENT ENCOUNTER: Primary | ICD-10-CM

## 2019-07-29 DIAGNOSIS — S62.356D CLOSED NONDISPLACED FRACTURE OF SHAFT OF FIFTH METACARPAL BONE OF RIGHT HAND WITH ROUTINE HEALING, SUBSEQUENT ENCOUNTER: ICD-10-CM

## 2019-07-29 NOTE — TELEPHONE ENCOUNTER
CALI WITH [facility] CALLED REQUESTING A PT ORDER FOR PATIENT.  PATIENT IS BEING TREATED FOR RIGHT 3RD & 5TH METACARPAL FRACTURES.  PLEASE FAX ORDER -[phone]

## 2019-08-22 ENCOUNTER — OFFICE VISIT (OUTPATIENT)
Dept: ORTHOPEDIC SURGERY | Facility: CLINIC | Age: 84
End: 2019-08-22

## 2019-08-22 VITALS — HEIGHT: 63 IN | TEMPERATURE: 98 F | WEIGHT: 157 LBS | BODY MASS INDEX: 27.82 KG/M2

## 2019-08-22 DIAGNOSIS — G56.01 CARPAL TUNNEL SYNDROME OF RIGHT WRIST: ICD-10-CM

## 2019-08-22 DIAGNOSIS — S62.356D CLOSED NONDISPLACED FRACTURE OF SHAFT OF FIFTH METACARPAL BONE OF RIGHT HAND WITH ROUTINE HEALING, SUBSEQUENT ENCOUNTER: Primary | ICD-10-CM

## 2019-08-22 DIAGNOSIS — S62.304D CLOSED NONDISPLACED FRACTURE OF FOURTH METACARPAL BONE OF RIGHT HAND WITH ROUTINE HEALING, UNSPECIFIED PORTION OF METACARPAL, SUBSEQUENT ENCOUNTER: ICD-10-CM

## 2019-08-22 PROCEDURE — 73120 X-RAY EXAM OF HAND: CPT | Performed by: ORTHOPAEDIC SURGERY

## 2019-08-22 PROCEDURE — 99213 OFFICE O/P EST LOW 20 MIN: CPT | Performed by: ORTHOPAEDIC SURGERY

## 2019-09-13 ENCOUNTER — OUTSIDE FACILITY SERVICE (OUTPATIENT)
Dept: ORTHOPEDIC SURGERY | Facility: CLINIC | Age: 84
End: 2019-09-13

## 2019-09-13 PROCEDURE — 64721 CARPAL TUNNEL SURGERY: CPT | Performed by: ORTHOPAEDIC SURGERY

## 2019-09-13 RX ORDER — HYDROCODONE BITARTRATE AND ACETAMINOPHEN 5; 325 MG/1; MG/1
1 TABLET ORAL EVERY 12 HOURS PRN
Qty: 20 TABLET | Refills: 0 | Status: SHIPPED | OUTPATIENT
Start: 2019-09-13 | End: 2019-10-21

## 2019-09-16 DIAGNOSIS — M80.00XA OSTEOPOROSIS WITH CURRENT PATHOLOGICAL FRACTURE, UNSPECIFIED OSTEOPOROSIS TYPE, INITIAL ENCOUNTER: ICD-10-CM

## 2019-09-16 DIAGNOSIS — S62.356D CLOSED NONDISPLACED FRACTURE OF SHAFT OF FIFTH METACARPAL BONE OF RIGHT HAND WITH ROUTINE HEALING, SUBSEQUENT ENCOUNTER: ICD-10-CM

## 2019-09-16 DIAGNOSIS — S62.309D: ICD-10-CM

## 2019-09-16 DIAGNOSIS — S62.304A CLOSED NONDISPLACED FRACTURE OF FOURTH METACARPAL BONE OF RIGHT HAND, UNSPECIFIED PORTION OF METACARPAL, INITIAL ENCOUNTER: ICD-10-CM

## 2019-09-16 DIAGNOSIS — S62.642D CLOSED NONDISPLACED FRACTURE OF PROXIMAL PHALANX OF RIGHT MIDDLE FINGER WITH ROUTINE HEALING, SUBSEQUENT ENCOUNTER: ICD-10-CM

## 2019-09-16 NOTE — TELEPHONE ENCOUNTER
PATIENT'S DAUGHTER, MATT, CALLED AND STATED THAT PATIENT IS IN QUITE A BIT OF PAIN.  SHE STATED THAT PATIENT IS TAKING THE HYDROCODONE 1 IN THE MORNING AND 1 BEFORE BED.  PATIENT IS TAKING IBUPROFEN AND TYLENOL THROUGHOUT THE DAY AND NOTHING SEEMS TO BE HELPING.  PATIENT IS S/P RIGHT CARPAL TUNNEL RELEASE ON 9/13/19.  PLEASE ADVISE

## 2019-09-17 DIAGNOSIS — M80.00XA OSTEOPOROSIS WITH CURRENT PATHOLOGICAL FRACTURE, UNSPECIFIED OSTEOPOROSIS TYPE, INITIAL ENCOUNTER: ICD-10-CM

## 2019-09-17 DIAGNOSIS — S62.304A CLOSED NONDISPLACED FRACTURE OF FOURTH METACARPAL BONE OF RIGHT HAND, UNSPECIFIED PORTION OF METACARPAL, INITIAL ENCOUNTER: ICD-10-CM

## 2019-09-17 DIAGNOSIS — S62.309D: ICD-10-CM

## 2019-09-17 DIAGNOSIS — S62.642D CLOSED NONDISPLACED FRACTURE OF PROXIMAL PHALANX OF RIGHT MIDDLE FINGER WITH ROUTINE HEALING, SUBSEQUENT ENCOUNTER: ICD-10-CM

## 2019-09-17 DIAGNOSIS — S62.356D CLOSED NONDISPLACED FRACTURE OF SHAFT OF FIFTH METACARPAL BONE OF RIGHT HAND WITH ROUTINE HEALING, SUBSEQUENT ENCOUNTER: ICD-10-CM

## 2019-09-17 RX ORDER — TRAMADOL HYDROCHLORIDE 50 MG/1
50 TABLET ORAL EVERY 12 HOURS PRN
Qty: 30 TABLET | Refills: 0 | OUTPATIENT
Start: 2019-09-17

## 2019-09-17 NOTE — TELEPHONE ENCOUNTER
Please have the family loosen up the dressing, apply ice locally and we can call in some Ultram 50 mg tab 1 p.o. every 12 in addition to the Norco.  Please send me the prescription electronically and we can call in 30 pills of Ultram thank you

## 2019-09-17 NOTE — TELEPHONE ENCOUNTER
Evenity order faxed along with med list prior DEXA and demographics to [facility] [phone]    9/20 at 8:45

## 2019-09-17 NOTE — TELEPHONE ENCOUNTER
Waiting for Dr. Landaverde to sign. // RX has been called into [facility] in [place].  Patients daughter Bambi has been informed and voiced understanding.     Will get Evenity injection set up at [facility].

## 2019-09-19 ENCOUNTER — TELEPHONE (OUTPATIENT)
Dept: ORTHOPEDIC SURGERY | Facility: CLINIC | Age: 84
End: 2019-09-19

## 2019-09-19 DIAGNOSIS — S62.309D: ICD-10-CM

## 2019-09-19 DIAGNOSIS — S62.304A CLOSED NONDISPLACED FRACTURE OF FOURTH METACARPAL BONE OF RIGHT HAND, UNSPECIFIED PORTION OF METACARPAL, INITIAL ENCOUNTER: ICD-10-CM

## 2019-09-19 DIAGNOSIS — M80.00XA OSTEOPOROSIS WITH CURRENT PATHOLOGICAL FRACTURE, UNSPECIFIED OSTEOPOROSIS TYPE, INITIAL ENCOUNTER: ICD-10-CM

## 2019-09-19 DIAGNOSIS — S62.356D CLOSED NONDISPLACED FRACTURE OF SHAFT OF FIFTH METACARPAL BONE OF RIGHT HAND WITH ROUTINE HEALING, SUBSEQUENT ENCOUNTER: ICD-10-CM

## 2019-09-19 DIAGNOSIS — S62.642D CLOSED NONDISPLACED FRACTURE OF PROXIMAL PHALANX OF RIGHT MIDDLE FINGER WITH ROUTINE HEALING, SUBSEQUENT ENCOUNTER: ICD-10-CM

## 2019-09-19 NOTE — TELEPHONE ENCOUNTER
"Lynette HOFFMANN at [facility] called stating an order Jaiden Seo entered needs to be re written. Lynette needs new order for Evenity -a subcutaneous injection for osteoporosis. Previous order was one syringe, new order \"supposed to be 210 MG for 2 syringes once a month for 12 months, or every thirty days\". Fax # [phone]    Lynette in office till about 5:15. Thanks /srh  "

## 2019-09-20 ENCOUNTER — OFFICE VISIT (OUTPATIENT)
Dept: ORTHOPEDIC SURGERY | Facility: CLINIC | Age: 84
End: 2019-09-20

## 2019-09-20 VITALS — BODY MASS INDEX: 27.64 KG/M2 | HEIGHT: 63 IN | TEMPERATURE: 97.4 F | WEIGHT: 156 LBS

## 2019-09-20 DIAGNOSIS — Z98.890 S/P CARPAL TUNNEL RELEASE: Primary | ICD-10-CM

## 2019-09-20 PROCEDURE — 99024 POSTOP FOLLOW-UP VISIT: CPT | Performed by: ORTHOPAEDIC SURGERY

## 2019-09-20 NOTE — TELEPHONE ENCOUNTER
Jaiden please help out with this order because I do not exactly know what needs to be done here.  Thank you

## 2019-09-20 NOTE — PROGRESS NOTES
OTHER POST OP GLOBAL     NAME: Macie Miller  ?  : 1931  ?  MRN: [id number]    Chief Complaint   Patient presents with   • Right Hand - Follow-up   • Wound Check     ?  Date of surgery: 2019.    HPI:   Patient returns today for 1 week(s) follow up of right carpal tunnel release. Incision(s) healing nicely/as expected with no signs of infection. Patient reports doing well with no unusual complaints. Appears to be progressing appropriately.      Review of Systems:      Ortho Exam:   Right wrist-carpal tunnel. The patient is 1  week(s) post carpal tunnel release. Incision is clean and healing well. Appropriate amounts of swelling and bruising are noted. There is a mild amount of erythema. No evidence of deep seated joint infection is noted. The patient has well maintained median nerve function. No evidence of RSD is noted. Moving 2 point discrimination is slightly prolonged but is starting to revert back to more normal perimeters. No evidence of ulnar nerve deficit is noted. Capillary refill is 2 seconds with a brisk return. There is no evidence of neurological dysfunction at this point.      Diagnostic Studies:  no diagnostic testing performed this visit      Assessment:  Macie was seen today for wound check and follow-up.    Diagnoses and all orders for this visit:    S/P carpal tunnel release          Plan   Incision care  Continue ice as needed  Active/Active Assisted ROM of elbow, wrist and hand  Stretching and strengthening exercises  Tylenol 500-1000mg by mouth every 6 hours as needed for pain   Fall precautions  Follow up in 4 week(s)     Date of encounter: 2019  Johnathan Landaverde MD

## 2019-09-30 ENCOUNTER — TELEPHONE (OUTPATIENT)
Dept: ORTHOPEDIC SURGERY | Facility: CLINIC | Age: 84
End: 2019-09-30

## 2019-09-30 DIAGNOSIS — Z98.890 STATUS POST CARPAL TUNNEL RELEASE: Primary | ICD-10-CM

## 2019-09-30 NOTE — TELEPHONE ENCOUNTER
Yes please it is okay to start physical therapy on the hand.  Please fax over a note to the patient so she can start this therapy at [facility].

## 2019-09-30 NOTE — TELEPHONE ENCOUNTER
PATIENT CALLED AND STATES THAT SHE DIDN'T ASK YOU AT HER POST-OP VISIT IF IT WAS OKAY TO START PHYSICAL THERAPY BC YOU WERE SO BUSY. SHE STATES THAT HER HAND IS GETTING REALLY STIFF FROM THE CARPAL TUNNEL RELEASE AND SHE WANTS TO KNOW IF SHE CAN GO AHEAD AND START PT. SHE HAS BEEN DOING SOME HOME EXERCISES HERSELF AND SHE THINKS THAT IS HELPING. SHE WANTS ORDER FAXED TO [facility]. PLEASE ADVISE/RJ

## 2019-10-03 ENCOUNTER — TELEPHONE (OUTPATIENT)
Dept: ORTHOPEDIC SURGERY | Facility: CLINIC | Age: 84
End: 2019-10-03

## 2019-10-03 NOTE — TELEPHONE ENCOUNTER
Patient states she had carpal tunnel release three weeks ago and she has more pain now than she did before.  She states at night time, she really struggles.  She has numbness in the thumb and two of her fingers.  She is wanting to know if this is normal for this to be like this or what can she do for the pain.  She is not asking for pain medication.  She just wants answers.

## 2019-10-03 NOTE — TELEPHONE ENCOUNTER
She has had compression of the median nerve for several months and possibly years.  It does take time for things to settle down.  I would recommend active aggressive range of motion exercises.  I would also recommend physical therapy to the upper extremity.  She will eventually benefit from the use of Lyrica or Neurontin.  She can have her PCP call in some Neurontin for her.  Her dose would be 300 mg tab 1 p.o. nightly for a trial of about 4 weeks.  Thank

## 2019-10-21 ENCOUNTER — OFFICE VISIT (OUTPATIENT)
Dept: ORTHOPEDIC SURGERY | Facility: CLINIC | Age: 84
End: 2019-10-21

## 2019-10-21 VITALS — BODY MASS INDEX: 27.57 KG/M2 | WEIGHT: 155.6 LBS | HEIGHT: 63 IN | TEMPERATURE: 97.5 F

## 2019-10-21 DIAGNOSIS — M25.50 POLYARTHRALGIA: ICD-10-CM

## 2019-10-21 DIAGNOSIS — M25.541 ARTHRALGIA OF RIGHT HAND: Primary | ICD-10-CM

## 2019-10-21 DIAGNOSIS — Z51.81 ENCOUNTER FOR MEDICATION MONITORING: ICD-10-CM

## 2019-10-21 DIAGNOSIS — M25.40 JOINT SWELLING: ICD-10-CM

## 2019-10-21 PROCEDURE — 99024 POSTOP FOLLOW-UP VISIT: CPT | Performed by: PHYSICIAN ASSISTANT

## 2019-10-21 PROCEDURE — 73130 X-RAY EXAM OF HAND: CPT | Performed by: PHYSICIAN ASSISTANT

## 2019-10-21 RX ORDER — HYDROCODONE BITARTRATE AND ACETAMINOPHEN 5; 325 MG/1; MG/1
1 TABLET ORAL NIGHTLY PRN
Qty: 30 TABLET | Refills: 0 | Status: SHIPPED | OUTPATIENT
Start: 2019-10-21

## 2019-10-31 ENCOUNTER — OFFICE VISIT (OUTPATIENT)
Dept: ORTHOPEDIC SURGERY | Facility: CLINIC | Age: 84
End: 2019-10-31

## 2019-10-31 VITALS — BODY MASS INDEX: 26.7 KG/M2 | HEIGHT: 64 IN | WEIGHT: 156.4 LBS

## 2019-10-31 DIAGNOSIS — G89.29 CHRONIC PAIN OF RIGHT THUMB: Primary | ICD-10-CM

## 2019-10-31 DIAGNOSIS — M79.644 CHRONIC PAIN OF RIGHT THUMB: Primary | ICD-10-CM

## 2019-10-31 DIAGNOSIS — Z98.890 S/P CARPAL TUNNEL RELEASE: ICD-10-CM

## 2019-10-31 PROBLEM — M25.40 JOINT SWELLING: Status: ACTIVE | Noted: 2019-10-31

## 2019-10-31 PROBLEM — M25.541 ARTHRALGIA OF RIGHT HAND: Status: ACTIVE | Noted: 2019-10-31

## 2019-10-31 PROBLEM — M25.50 POLYARTHRALGIA: Status: ACTIVE | Noted: 2019-10-31

## 2019-10-31 PROBLEM — Z51.81 ENCOUNTER FOR MEDICATION MONITORING: Status: ACTIVE | Noted: 2019-10-31

## 2019-10-31 PROCEDURE — 99024 POSTOP FOLLOW-UP VISIT: CPT | Performed by: ORTHOPAEDIC SURGERY

## 2019-10-31 RX ADMIN — METHYLPREDNISOLONE ACETATE 160 MG: 80 INJECTION, SUSPENSION INTRA-ARTICULAR; INTRALESIONAL; INTRAMUSCULAR; SOFT TISSUE at 13:21

## 2019-10-31 RX ADMIN — LIDOCAINE HYDROCHLORIDE 2 ML: 10 INJECTION, SOLUTION INFILTRATION; PERINEURAL at 13:21

## 2019-10-31 NOTE — PROGRESS NOTES
OTHER POST OP GLOBAL     NAME: Macie Miller  ?  : 1931  ?  MRN: [id number]    Chief Complaint   Patient presents with   • Right Hand - Pain, Follow-up   • Right Wrist - Pain, Follow-up     ?  Date of surgery: 2019    HPI:   Patient returns today for 5 week(s) follow up of right carpal tunnel release. Incision(s) healed nicely with no signs of infection.  She returns stating she is experiencing numbness and pain into her fingers and her symptoms seem to be worsened at bedtime.  She continues doing physical therapy 2 times a week at [facility].  She is using Advil and Tylenol with little improvement in symptoms and states the tramadol is not much help either.  Patient states we have not obtained an EMG or nerve conduction study of the hand.  She has had issues with this hand since suffering a fall and fracturing the metacarpals.      Review of Systems:      Ortho Exam:   Right wrist-carpal tunnel. The patient is 5 week post carpal tunnel release. Incision is clean and healed well. No evidence of deep seated joint infection is noted.  The patient does experience some paresthesias into the fingers.  No evidence of RSD is noted. Moving 2 point discrimination is slightly prolonged. No evidence of ulnar nerve deficit is noted. Capillary refill is 2 seconds with a brisk return.      Diagnostic Studies:  X-Ray Report:  Right Hand(s) X-Ray  Indication: Evaluation of right hand pain  AP, Lateral views  Findings: No acute bony abnormality, there are osteoarthritic changes present that we have previously discussed  Bony lesion: no  Soft tissues: within normal limits  Joint spaces: decreased  Hardware appropriately positioned: not applicable  Prior studies available for comparison: yes   X-RAY was ordered and reviewed by Jaiden Seo PA-C        Assessment:  Macie was seen today for pain, follow-up, pain and follow-up.    Diagnoses and all orders for this visit:    Arthralgia of right  hand  -     Rheumatoid Factor; Future  -     C-reactive Protein; Future  -     Sedimentation Rate; Future  -     Cancel: Anti-Smith Antibody; Future  -     Cancel: Nuclear Antigen Antibody, IFA; Future  -     RNP Antibodies; Future  -     Cancel: Anti-DNA Antibody, Double-stranded; Future  -     Cancel: Sjogren's Antibody, Anti-SS-A / -SS-B; Future  -     Cancel: Comprehensive Metabolic Panel; Future  -     Cancel: CBC & Differential; Future  -     XR Hand 3+ View Right  -     Anti-DNA Antibody, Double-stranded; Future  -     Anti-Smith Antibody; Future  -     CBC & Differential; Future  -     Comprehensive Metabolic Panel; Future  -     Sjogren's Antibody, Anti-SS-A / -SS-B; Future  -     Nuclear Antigen Antibody, IFA; Future  -     Uric Acid; Future  -     Sjogren's Antibody, Anti-SS-A / -SS-B    Joint swelling  -     Rheumatoid Factor; Future  -     C-reactive Protein; Future  -     Sedimentation Rate; Future  -     Cancel: Anti-Smith Antibody; Future  -     Cancel: Nuclear Antigen Antibody, IFA; Future  -     RNP Antibodies; Future  -     Cancel: Anti-DNA Antibody, Double-stranded; Future  -     Cancel: Sjogren's Antibody, Anti-SS-A / -SS-B; Future  -     Cancel: Comprehensive Metabolic Panel; Future  -     Cancel: CBC & Differential; Future  -     XR Hand 3+ View Right  -     Anti-DNA Antibody, Double-stranded; Future  -     Anti-Smith Antibody; Future  -     CBC & Differential; Future  -     Comprehensive Metabolic Panel; Future  -     Sjogren's Antibody, Anti-SS-A / -SS-B; Future  -     Nuclear Antigen Antibody, IFA; Future  -     Uric Acid; Future  -     Sjogren's Antibody, Anti-SS-A / -SS-B    Polyarthralgia  -     Rheumatoid Factor; Future  -     C-reactive Protein; Future  -     Sedimentation Rate; Future  -     Cancel: Anti-Smith Antibody; Future  -     Cancel: Nuclear Antigen Antibody, IFA; Future  -     RNP Antibodies; Future  -     Cancel: Anti-DNA Antibody, Double-stranded; Future  -     Cancel:  Sjogren's Antibody, Anti-SS-A / -SS-B; Future  -     Cancel: Comprehensive Metabolic Panel; Future  -     Cancel: CBC & Differential; Future  -     Anti-DNA Antibody, Double-stranded; Future  -     Anti-Smith Antibody; Future  -     CBC & Differential; Future  -     Comprehensive Metabolic Panel; Future  -     Sjogren's Antibody, Anti-SS-A / -SS-B; Future  -     Nuclear Antigen Antibody, IFA; Future  -     Uric Acid; Future  -     Sjogren's Antibody, Anti-SS-A / -SS-B    Encounter for medication monitoring  -     Rheumatoid Factor; Future  -     C-reactive Protein; Future  -     Sedimentation Rate; Future  -     Cancel: Anti-Smith Antibody; Future  -     Cancel: Nuclear Antigen Antibody, IFA; Future  -     RNP Antibodies; Future  -     Cancel: Anti-DNA Antibody, Double-stranded; Future  -     Cancel: Sjogren's Antibody, Anti-SS-A / -SS-B; Future  -     Cancel: Comprehensive Metabolic Panel; Future  -     Cancel: CBC & Differential; Future  -     Anti-DNA Antibody, Double-stranded; Future  -     Anti-Smith Antibody; Future  -     CBC & Differential; Future  -     Comprehensive Metabolic Panel; Future  -     Sjogren's Antibody, Anti-SS-A / -SS-B; Future  -     Nuclear Antigen Antibody, IFA; Future  -     Uric Acid; Future  -     Sjogren's Antibody, Anti-SS-A / -SS-B    Other orders  -     HYDROcodone-acetaminophen (NORCO) 5-325 MG per tablet; Take 1 tablet by mouth At Night As Needed for Mild Pain  or Moderate Pain .  -     SCANNED - LABS          Plan   Incision care  Continue ice as needed  Gentle active ROM  Stretching and strengthening exercises  Alternate Ibuprofen and Tylenol as needed   Vitamin B6 and B12 for nerve health  Fall precautions  We will further discuss ordering EMG and nerve conduction study with Dr. Landaverde.  New prescription for Jackson 5/325 sent to patient's pharmacy per Dr. Landaverde.  We also discussed at length the possibility and need to further investigate for underlying causes for continued right  hand pain such as autoimmune disease.  Patient will have labs performed at hospital near her home.    Date of encounter: 10/21/2019  Jaiden Seo PA-C    Electronically signed by Jaiden Seo PA-C, 10/31/19, 2:42 PM.

## 2019-11-07 PROBLEM — G89.29 CHRONIC PAIN OF RIGHT THUMB: Status: ACTIVE | Noted: 2019-11-07

## 2019-11-07 PROBLEM — M79.644 CHRONIC PAIN OF RIGHT THUMB: Status: ACTIVE | Noted: 2019-11-07

## 2019-11-07 RX ORDER — METHYLPREDNISOLONE ACETATE 80 MG/ML
160 INJECTION, SUSPENSION INTRA-ARTICULAR; INTRALESIONAL; INTRAMUSCULAR; SOFT TISSUE
Status: COMPLETED | OUTPATIENT
Start: 2019-10-31 | End: 2019-10-31

## 2019-11-07 RX ORDER — LIDOCAINE HYDROCHLORIDE 10 MG/ML
2 INJECTION, SOLUTION INFILTRATION; PERINEURAL
Status: COMPLETED | OUTPATIENT
Start: 2019-10-31 | End: 2019-10-31

## 2020-01-30 ENCOUNTER — OFFICE VISIT (OUTPATIENT)
Dept: ORTHOPEDIC SURGERY | Facility: CLINIC | Age: 85
End: 2020-01-30

## 2020-01-30 VITALS — HEIGHT: 63 IN | BODY MASS INDEX: 27.39 KG/M2 | WEIGHT: 154.6 LBS

## 2020-01-30 DIAGNOSIS — S62.356D CLOSED NONDISPLACED FRACTURE OF SHAFT OF FIFTH METACARPAL BONE OF RIGHT HAND WITH ROUTINE HEALING, SUBSEQUENT ENCOUNTER: ICD-10-CM

## 2020-01-30 DIAGNOSIS — Z98.890 S/P CARPAL TUNNEL RELEASE: Primary | ICD-10-CM

## 2020-01-30 PROCEDURE — 99213 OFFICE O/P EST LOW 20 MIN: CPT | Performed by: ORTHOPAEDIC SURGERY

## 2020-05-20 ENCOUNTER — TELEPHONE CONVERTED (OUTPATIENT)
Dept: GASTROENTEROLOGY | Facility: CLINIC | Age: 85
End: 2020-05-20
Attending: PHYSICIAN ASSISTANT

## 2020-06-05 ENCOUNTER — CLINICAL SUPPORT (OUTPATIENT)
Dept: ORTHOPEDIC SURGERY | Facility: CLINIC | Age: 85
End: 2020-06-05

## 2020-06-05 VITALS — BODY MASS INDEX: 27.46 KG/M2 | WEIGHT: 155 LBS | HEIGHT: 63 IN | TEMPERATURE: 97.3 F

## 2020-06-05 DIAGNOSIS — M18.11 ARTHRITIS OF CARPOMETACARPAL (CMC) JOINT OF RIGHT THUMB: Primary | ICD-10-CM

## 2020-06-05 DIAGNOSIS — Z98.890 S/P CARPAL TUNNEL RELEASE: ICD-10-CM

## 2020-06-05 PROCEDURE — 20600 DRAIN/INJ JOINT/BURSA W/O US: CPT | Performed by: PHYSICIAN ASSISTANT

## 2020-06-05 RX ORDER — PRAVASTATIN SODIUM 20 MG
TABLET ORAL
COMMUNITY

## 2020-06-05 RX ORDER — EPINEPHRINE 0.3 MG/.3ML
INJECTION SUBCUTANEOUS
COMMUNITY

## 2020-06-05 RX ORDER — DOXAZOSIN 8 MG/1
TABLET ORAL
COMMUNITY

## 2020-06-05 RX ORDER — HYDROCHLOROTHIAZIDE 25 MG/1
TABLET ORAL
COMMUNITY

## 2020-06-05 RX ORDER — METFORMIN HYDROCHLORIDE 500 MG/1
TABLET, EXTENDED RELEASE ORAL
COMMUNITY

## 2020-06-05 RX ORDER — ROSUVASTATIN CALCIUM 10 MG/1
TABLET, COATED ORAL
COMMUNITY
Start: 2020-03-19 | End: 2020-06-05

## 2020-06-05 RX ORDER — DICYCLOMINE HYDROCHLORIDE 10 MG/1
CAPSULE ORAL
COMMUNITY
Start: 2020-04-27

## 2020-06-05 RX ORDER — ALBUTEROL SULFATE 90 UG/1
AEROSOL, METERED RESPIRATORY (INHALATION)
COMMUNITY

## 2020-06-05 RX ORDER — CALCIUM CITRATE/VITAMIN D3 200MG-6.25
TABLET ORAL
COMMUNITY
Start: 2020-04-21

## 2020-06-05 RX ORDER — MONTELUKAST SODIUM 10 MG/1
TABLET ORAL
COMMUNITY

## 2020-06-05 RX ORDER — LIDOCAINE 50 MG/G
OINTMENT TOPICAL
COMMUNITY

## 2020-06-05 NOTE — PROGRESS NOTES
Small Joint Arthrocentesis: R thumb CMC  Consent given by: patient  Site marked: site marked  Timeout: Immediately prior to procedure a time out was called to verify the correct patient, procedure, equipment, support staff and site/side marked as required   Supporting Documentation  Indications: pain   Procedure Details  Location: thumb - R thumb CMC  Preparation: Patient was prepped and draped in the usual sterile fashion  Needle size: 27 G  Approach: dorsal  Medications administered: 0.5 mL lidocaine PF 1% 1 %; 40 mg methylPREDNISolone acetate 80 MG/ML  Patient tolerance: patient tolerated the procedure well with no immediate complications      Electronically signed by Jaiden Seo PA-C, 06/15/20, 12:42 PM.

## 2020-06-15 RX ORDER — LIDOCAINE HYDROCHLORIDE 10 MG/ML
0.5 INJECTION, SOLUTION EPIDURAL; INFILTRATION; INTRACAUDAL; PERINEURAL
Status: COMPLETED | OUTPATIENT
Start: 2020-06-15 | End: 2020-06-15

## 2020-06-15 RX ORDER — METHYLPREDNISOLONE ACETATE 80 MG/ML
40 INJECTION, SUSPENSION INTRA-ARTICULAR; INTRALESIONAL; INTRAMUSCULAR; SOFT TISSUE
Status: COMPLETED | OUTPATIENT
Start: 2020-06-15 | End: 2020-06-15

## 2020-06-15 RX ADMIN — LIDOCAINE HYDROCHLORIDE 0.5 ML: 10 INJECTION, SOLUTION EPIDURAL; INFILTRATION; INTRACAUDAL; PERINEURAL at 12:50

## 2020-06-15 RX ADMIN — METHYLPREDNISOLONE ACETATE 40 MG: 80 INJECTION, SUSPENSION INTRA-ARTICULAR; INTRALESIONAL; INTRAMUSCULAR; SOFT TISSUE at 12:50

## 2020-06-15 NOTE — PROGRESS NOTES
INJECTION      Patient: Macie Miller    MRN: [id number]    YOB: 1931    Chief Complaint   Patient presents with   • Right Hand - Follow-up, Pain         History of Present Illness:  Macie Miller is here today for injection therapy. She has been seen previously by Dr. Landaverde for CMC joint pain and was offered an injection but she did not wish to do it at that time.  Today she returns because she would like to receive right thumb-CMC joint injections of steroid. Treatment options have been discussed and she understands and consents.  She has previously underwent a carpal tunnel release of the right hand and wrist following a fracture, but she states that she is still experiencing numbness and tingling.      Physical Exam:   89 y.o. female awake, alert, oriented, in no acute distress and well developed, well nourished.  Positive for tenderness at base of the 1st metacarpal   Positive for swelling of skin and soft tissues   Flexor and extensor tendon function is well preserved. Capillary refill is 2 seconds with a brisk return. Neurovascular status is intact.  Positive for crepitus and pain with axial loading of the joint   Pinch strength is compromised  Slight subluxation of the 1st metacarpal base is noted.       Procedures  See separate procedure note  Injection site was identified by physical examination and cleaned with Betadine and alcohol swabs. Prior to needle insertion, ethyl chloride spray was used for surface anesthesia. Sterile technique was used.       ASSESSMENT:  Macie was seen today for follow-up and pain.    Diagnoses and all orders for this visit:    Arthritis of carpometacarpal (CMC) joint of right thumb  -     Small Joint Arthrocentesis: R thumb CMC    S/P carpal tunnel release          PLAN:   • Right CMC-thumb steroid injection was discussed with the patient. Discussed indication, risks, benefits, and alternatives. Verbal consent was given to proceed with the procedure.    • Injection was performed from Dorsal approach.  Patient tolerated the procedure well and no complications were encountered.  • Discussion of orthopedic goals and activities and patient/guardian expressed understanding.  • Ice, heat, rest, compression and elevation of extremity as beneficial  • nsaids and/or tylenol as beneficial  • Instructed to refrain from heavy activity/rest the extremity for the next 24-48 hours  • Discussion regarding possibility of cortisol flare and what to expect if this occurs  • Watch for signs and symptoms of infection  • Call if adverse effect from injection therapy  • Follow up in 3 months  • Also discussed trying the CMC joint program at [facility] in [place].      Jaiden Seo PA-C  Encounter Date: 6/5/2020    Electronically signed by Jaiden Seo PA-C, 06/15/20, 12:51 PM.      EMR Dragon/Transcription disclaimer:  Much of this encounter note is an electronic transcription/translation of spoken language to printed text. The electronic translation of spoken language may permit erroneous, or at times, nonsensical words or phrases to be inadvertently transcribed; Although I have reviewed the note for such errors, some may still exist.

## 2020-09-08 ENCOUNTER — CLINICAL SUPPORT (OUTPATIENT)
Dept: ORTHOPEDIC SURGERY | Facility: CLINIC | Age: 85
End: 2020-09-08

## 2020-09-08 VITALS — BODY MASS INDEX: 27.64 KG/M2 | TEMPERATURE: 97.1 F | HEIGHT: 63 IN | WEIGHT: 156 LBS

## 2020-09-08 DIAGNOSIS — M18.11 ARTHRITIS OF CARPOMETACARPAL (CMC) JOINT OF RIGHT THUMB: Primary | ICD-10-CM

## 2020-09-08 PROCEDURE — 20600 DRAIN/INJ JOINT/BURSA W/O US: CPT | Performed by: PHYSICIAN ASSISTANT

## 2020-09-08 RX ORDER — METHYLPREDNISOLONE ACETATE 80 MG/ML
40 INJECTION, SUSPENSION INTRA-ARTICULAR; INTRALESIONAL; INTRAMUSCULAR; SOFT TISSUE
Status: COMPLETED | OUTPATIENT
Start: 2020-09-08 | End: 2020-09-08

## 2020-09-08 RX ORDER — MONTELUKAST SODIUM 4 MG/1
1 TABLET, CHEWABLE ORAL DAILY PRN
COMMUNITY
Start: 2020-06-26

## 2020-09-08 RX ORDER — CETIRIZINE HYDROCHLORIDE 10 MG/1
10 TABLET ORAL DAILY
COMMUNITY
Start: 2020-06-25

## 2020-09-08 RX ORDER — LIDOCAINE HYDROCHLORIDE 10 MG/ML
1 INJECTION, SOLUTION INFILTRATION; PERINEURAL
Status: COMPLETED | OUTPATIENT
Start: 2020-09-08 | End: 2020-09-08

## 2020-09-08 RX ADMIN — LIDOCAINE HYDROCHLORIDE 1 ML: 10 INJECTION, SOLUTION INFILTRATION; PERINEURAL at 11:44

## 2020-09-08 RX ADMIN — METHYLPREDNISOLONE ACETATE 40 MG: 80 INJECTION, SUSPENSION INTRA-ARTICULAR; INTRALESIONAL; INTRAMUSCULAR; SOFT TISSUE at 11:44

## 2020-09-08 NOTE — PROGRESS NOTES
Small Joint Arthrocentesis: R thumb CMC  Consent given by: patient  Site marked: site marked  Timeout: Immediately prior to procedure a time out was called to verify the correct patient, procedure, equipment, support staff and site/side marked as required   Supporting Documentation  Indications: pain   Procedure Details  Location: thumb - R thumb CMC  Preparation: Patient was prepped and draped in the usual sterile fashion  Needle size: 27 G  Approach: dorsal  Medications administered: 1 mL lidocaine 1 %; 40 mg methylPREDNISolone acetate 80 MG/ML  Patient tolerance: patient tolerated the procedure well with no immediate complications      Electronically signed by Jaiden Seo PA-C, 09/08/20, 11:44 AM.

## 2021-01-18 ENCOUNTER — CLINICAL SUPPORT (OUTPATIENT)
Dept: ORTHOPEDIC SURGERY | Facility: CLINIC | Age: 86
End: 2021-01-18

## 2021-01-18 VITALS — HEIGHT: 63 IN | BODY MASS INDEX: 27.64 KG/M2 | TEMPERATURE: 97.5 F | WEIGHT: 156 LBS

## 2021-01-18 DIAGNOSIS — M18.11 ARTHRITIS OF CARPOMETACARPAL (CMC) JOINT OF RIGHT THUMB: Primary | ICD-10-CM

## 2021-01-18 PROCEDURE — 20600 DRAIN/INJ JOINT/BURSA W/O US: CPT | Performed by: PHYSICIAN ASSISTANT

## 2021-01-18 RX ORDER — CEFDINIR 300 MG/1
300 CAPSULE ORAL 2 TIMES DAILY
COMMUNITY
Start: 2020-12-30

## 2021-01-18 RX ORDER — LIDOCAINE HYDROCHLORIDE 10 MG/ML
0.5 INJECTION, SOLUTION INFILTRATION; PERINEURAL
Status: COMPLETED | OUTPATIENT
Start: 2021-01-18 | End: 2021-01-18

## 2021-01-18 RX ORDER — NITROFURANTOIN 25; 75 MG/1; MG/1
CAPSULE ORAL
COMMUNITY

## 2021-01-18 RX ORDER — METHYLPREDNISOLONE ACETATE 80 MG/ML
40 INJECTION, SUSPENSION INTRA-ARTICULAR; INTRALESIONAL; INTRAMUSCULAR; SOFT TISSUE
Status: COMPLETED | OUTPATIENT
Start: 2021-01-18 | End: 2021-01-18

## 2021-01-18 RX ORDER — PREDNISONE 10 MG/1
10 TABLET ORAL 2 TIMES DAILY
COMMUNITY
Start: 2020-12-30

## 2021-01-18 RX ADMIN — METHYLPREDNISOLONE ACETATE 40 MG: 80 INJECTION, SUSPENSION INTRA-ARTICULAR; INTRALESIONAL; INTRAMUSCULAR; SOFT TISSUE at 12:07

## 2021-01-18 RX ADMIN — LIDOCAINE HYDROCHLORIDE 0.5 ML: 10 INJECTION, SOLUTION INFILTRATION; PERINEURAL at 12:07

## 2021-01-18 NOTE — PROGRESS NOTES
Chief Complaint  No chief complaint on file.    Subjective    History of Present Illness      Macie Miller is a 89 y.o. female who presents to [facility] for is here today for injection therapy. She receives RIGHT thumb-CMC joint injections of steroid. Since last injection, patient notes substantial relief of symptoms. She is just starting to have some discomfort again. She does report having a difficult time with not being able to get out of her house due to COVID. Treatment options have been discussed and she understands and consents.       Objective   Vital Signs:   There were no vitals taken for this visit.    Physical Exam  89 y.o. female is awake, alert, oriented, in no acute distress and well developed, well nourished.  Ortho Exam   RIGHT thumb  Positive for tenderness at base of the 1st metacarpal   Positive for swelling of skin and soft tissues   Flexor and extensor tendon function is well preserved. Capillary refill is 2 seconds with a brisk return. Neurovascular status is intact.  Positive for crepitus and pain with axial loading of the joint   Pinch strength is compromised  Slight subluxation of the 1st metacarpal base is noted.       Result Review :                  Procedures   See separate procedure note  Injection site was identified by physical examination and cleaned with Betadine and alcohol swabs. Prior to needle insertion, ethyl chloride spray was used for surface anesthesia. Sterile technique was used.       Assessment   Assessment and Plan    Problem List Items Addressed This Visit     None          Follow Up   • Right CMC-thumb steroid injection was discussed with the patient. Discussed indication, risks, benefits, and alternatives. Verbal consent was given to proceed with the procedure.   • Injection was performed from dorsal approach.  Patient tolerated the procedure well and no complications were encountered.  • Discussion of  orthopedic goals and activities and patient/guardian expressed understanding.  • Ice, heat, rest, compression and elevation of extremity as beneficial  • nsaids and/or tylenol as beneficial  • Instructed to refrain from heavy activity/rest the extremity for the next 24-48 hours  • Discussion regarding possibility of cortisol flare and what to expect if this occurs  • Watch for signs and symptoms of infection  • Call if adverse effect from injection therapy  • Follow up in 3 months or PRN  • Patient was given instructions and counseling regarding her condition or for health maintenance advice. Please see specific information pulled into the AVS if appropriate.     Jaiden Seo PA-C   Date of Encounter: 1/18/2021   Electronically signed by Jaiden Seo PA-C, 01/18/21, 11:09 AM EST.     EMR Dragon/Transcription disclaimer:  Much of this encounter note is an electronic transcription/translation of spoken language to printed text. The electronic translation of spoken language may permit erroneous, or at times, nonsensical words or phrases to be inadvertently transcribed; Although I have reviewed the note for such errors, some may still exist.

## 2021-01-18 NOTE — PROGRESS NOTES
Procedure   Small Joint Arthrocentesis: R thumb CMC  Consent given by: patient  Site marked: site marked  Timeout: Immediately prior to procedure a time out was called to verify the correct patient, procedure, equipment, support staff and site/side marked as required   Supporting Documentation  Indications: pain   Procedure Details  Location: thumb - R thumb CMC  Preparation: Patient was prepped and draped in the usual sterile fashion  Needle size: 27 G  Approach: dorsal  Medications administered: 0.5 mL lidocaine 1 %; 40 mg methylPREDNISolone acetate 80 MG/ML        Electronically signed by Jaiden Seo PA-C, 01/18/21, 12:07 PM EST.

## 2021-05-13 NOTE — PROGRESS NOTES
Quick Note      Patient Name: Macie Miller   Patient ID: [id number]   Sex: Female   YOB: 1931    Primary Care Provider: Demi ARMIJO   Referring Provider: Demi ARMIJO    Visit Date: May 20, 2020    Provider: Vince Arellano PA-C   Location: [facility]   Location Address: [address] [place] [address]  [id number]   Location Phone: [phone]          History Of Present Illness  TELEHEALTH TELEPHONE VISIT  Chief Complaint: Altered bowel habits   Macie Miller is a 89 year old /White female who is presenting for evaluation via telehealth telephone visit. Verbal consent obtained before beginning visit.   Provider spent 11 minutes with the patient during telehealth visit.   The following staff were present during this visit: Timbo Miles MA   Past Medical History/Overview of Patient Symptoms     89 year old female agrees to a telehealth/telephone visit for altered bowel habits.  This started in January 2020.  This has significantly improved in the last three weeks with the use of bentyl 10mg.  She reports normal stool studies. She reports a normal colonoscopy 10 years ago.  She is not interested in a colonoscopy.           Assessment  · Altered bowel habits     787.99/R19.4      Plan  · Orders  o Physician Telephone Evaluation, 11-20 minutes (06299) - 787.99/R19.4 - 05/20/2020  · Medications  o Medications have been Reconciled  o Transition of Care or Provider Policy  · Instructions  o Plan Of Care: 89 year old female with resolved altered bowel patterns and has a daily bowel movement. She will continue dicyclomine 10mg prn. She will call for worsening symptoms. We will defer colonoscopy due to her symptoms resolving and her age. She is in agreement with this plan.  o Chronic conditions reviewed and taken into consideration for today's treatment plan.  o Patient instructed to seek medical attention urgently for new or worsening symptoms.  o Patient was  educated/instructed on their diagnosis, treatment and medications prior to discharge from the clinic today.            Electronically Signed by: Vince Arellano PA-C -Author on May 20, 2020 10:27:08 AM  Electronically Co-signed by: Ismael Rios MD -Reviewer on May 26, 2020 04:15:31 PM

## 2021-05-24 ENCOUNTER — CONVERSION ENCOUNTER (OUTPATIENT)
Dept: OTHER | Facility: HOSPITAL | Age: 86
End: 2021-05-24

## 2021-05-24 ENCOUNTER — OFFICE VISIT CONVERTED (OUTPATIENT)
Dept: CARDIOLOGY | Facility: CLINIC | Age: 86
End: 2021-05-24
Attending: SPECIALIST

## 2021-06-02 ENCOUNTER — TRANSCRIBE ORDERS (OUTPATIENT)
Dept: CARDIOLOGY | Facility: CLINIC | Age: 86
End: 2021-06-02

## 2021-06-02 DIAGNOSIS — I10 HYPERTENSION, UNSPECIFIED TYPE: Primary | ICD-10-CM

## 2021-06-05 NOTE — H&P
History and Physical      Patient Name: Macie Miller   Patient ID: [id number]   Sex: Female   YOB: 1931    Primary Care Provider: Demi ARMIJO   Referring Provider: Demi ARMIJO    Visit Date: May 24, 2021    Provider: Salvador Flores MD   [facility]   Location Address: [address] [place] [address]  [id number]   Location Phone: [phone]          Chief Complaint  · Shortness of breath  · Pedal edema      History Of Present Illness  Consult requested by: Miranda Orona MD   Macie Miller is a 89 year old /White female with history of shortness of breath which has increased in the last few weeks mostly on exertion relieved by rest. She also has some mild pedal edema. Started on Lasix recently which has improved some of her symptoms. No chest pain.   PAST MEDICAL HISTORY: Positive for diabetes mellitus, hypertension, CHF, hyperlipidemia, hypothyroidism and arthritis.   FAMILY HISTORY: Positive for diabetes mellitus. Negative for hypertension and heart disease.   PSYCHOSOCIAL HISTORY: Negative for depression and mood changes. Patient is . Caffeine intake daily. Does not drink alcohol and does not smoke.   CURRENT MEDICATIONS: include Doxazosin Mesylate 8 mg qhs; Hydrochlorothiazide 12.5 mg qam; Metformin  mg qpm; Montelukast 10 mg qhs; Rosuvastatin 5 mg twice a week; Ubidecarenone 100 mg qd; Miralax qd; Levothyroxine 50 mcg qd; Metoprolol Succ ER 25 mg qd; Furosemide 20 mg prn. The dosage and frequency of the medications were reviewed with the patient.   ALLERGIES: No known drug allergies.   CHOLESTEROL STATUS: high as per the patient.       Review of Systems  · Constitutional  o Admits  o : good general health lately; fatigue  o Denies  o : recent weight changes  · Eyes  o Denies  o : double vision  · HENT  o Admits  o : hearing loss or ringing  o Denies  o : chronic sinus problem, swollen glands in  "neck  · Cardiovascular  o Admits  o : swelling feet, ankles, hands; shortness of breath   o Denies  o : chest pain, palpitations (fast, fluttering, or skipping beats)  · Respiratory  o Admits  o : chronic coughs and asthma or wheezing  o Denies  o : COPD  · Gastrointestinal  o Denies  o : ulcers, nausea or vomiting  · Neurologic  o Admits  o : lightheaded or dizzy  o Denies  o : stroke, headaches  · Musculoskeletal  o Denies  o : joint pain, back pain  · Endocrine  o Admits  o : diabetes and thyroid disease  o Denies  o : heat or cold intolerance, excessive thirst or urination  · Heme-Lymph  o Denies  o : bleeding or bruising tendency, anemia      Vitals  Date Time BP Position Site L\R Cuff Size HR RR TEMP (F) WT  HT  BMI kg/m2 BSA m2 O2 Sat FR L/min FiO2 HC       05/24/2021 12:30 /67 Sitting    67 - R   160lbs 8oz 5'  4\" 27.55 1.81       05/24/2021 02:57 /67 Sitting    67 - R   160lbs 8oz 5'  4\" 27.55 1.81             Physical Examination  · Constitutional  o Appearance  o : Awake, alert, cooperative, pleasant.  · Eyes  o Pupils and Irises  o : Pupils equal and reacting to light and accommodation.  · Ears, Nose, Mouth and Throat  o Ears  o : Tympanic membranes are normal.  o Nose  o : Clear with no maxillary tenderness.  o Oral Cavity  o : Clear.  · Neck  o Inspection/Palpation  o : No JVD, bruits, thyromegaly, or adenopathy. Trachea midline. No axillary or cervical lymphadenopathy.  o Thyroid  o : No thyroid enlargement.   · Respiratory  o Inspection of Chest  o : No chest wall deformities, moving equal.  o Auscultation of Lungs  o : Good air entry with vesicular breath sounds.  o Percussion of Chest  o : Resonant bilaterally.   o Palpation of Chest  o : Equal movements bilaterally.  · Cardiovascular  o Heart  o :   § Auscultation of Heart  § : PMI is in the 5th intercostal space. S1 and S2 regular. No S3. No S4. No murmurs.  o Peripheral Vascular System  o :   § Extremities  § : No pedal edema. " Peripheral pulses well felt. No cyanosis.  · Gastrointestinal  o Abdominal Examination  o : No organomegaly, masses, tenderness, bruits, or abnormal pulsations. Bowel sounds are normal.  · Musculoskeletal  o General  o : Muscle strength is normal with normal tone.  · Skin and Subcutaneous Tissue  o General Inspection  o : No rash, petechiae, or suspicious skin lesions. Skin turgor is normal.          Assessment     IMPRESSION/PLAN    1. Shortness of breath. Chronic diastolic heart failure. Will change Lasix to 20 mg qod. Stop her Hydrochloride. Will do an echocardiogram to evaluate left ventricle systolic function. Be on a low salt diet, decrease her salt and fluid intake.  2. See me back in six months.        Salvador Flores MD  SANJANA/wt             Electronically Signed by: Kori Clarke-, -Author on May 26, 2021 02:25:28 PM  Electronically Co-signed by: Salvador Flores MD -Reviewer on June 2, 2021 12:41:45 PM

## 2021-07-14 ENCOUNTER — TELEPHONE (OUTPATIENT)
Dept: CARDIOLOGY | Facility: CLINIC | Age: 86
End: 2021-07-14

## 2021-07-14 NOTE — TELEPHONE ENCOUNTER
----- Message from ALEKSANDER Cintron sent at 7/12/2021  2:14 PM EDT -----  Notify patient echo results:  Normal left ventricular systolic function. Calculated left ventricular EF = 63%.  Mild mitral regurgitation  Mild tricuspid regurgitation  Mild pulmonary hypertension  Grade 1 diastolic dysfunction (CHF).  If she is still having shortness of breath then she can take extra dose of Lasix

## 2021-07-15 VITALS
WEIGHT: 160.5 LBS | DIASTOLIC BLOOD PRESSURE: 67 MMHG | DIASTOLIC BLOOD PRESSURE: 67 MMHG | BODY MASS INDEX: 27.4 KG/M2 | WEIGHT: 160.5 LBS | HEART RATE: 67 BPM | HEIGHT: 64 IN | BODY MASS INDEX: 27.4 KG/M2 | HEIGHT: 64 IN | SYSTOLIC BLOOD PRESSURE: 136 MMHG | SYSTOLIC BLOOD PRESSURE: 136 MMHG | HEART RATE: 67 BPM

## 2021-08-30 ENCOUNTER — TELEPHONE (OUTPATIENT)
Dept: ORTHOPEDIC SURGERY | Facility: CLINIC | Age: 86
End: 2021-08-30

## 2021-08-30 NOTE — TELEPHONE ENCOUNTER
Caller: DONOVAN BOLDEN    Relationship to patient: SELF    Best call back number: [phone]    Chief complaint: CORTISONE INJECTION (3 MO F/U R HAND CMC INJ)    Type of visit: INJECTION    Requested date: JUST CALL TO R/S     If rescheduling, when is the original appointment: 8.30.21- DIDN'T HAVE TRANSPORTATION    Additional notes: PATIENT STATED TO CALL HER TO R/S-- SHE SAID SHE DID HAVE AN EYE APPT TODAY (8.30.21) AND  THAT IF THE CALL WAS TODAY TO LEAVE A  MESSAGE WITH THE APPT INFORMATION ON THE VM. THANKS!!

## 2021-09-01 ENCOUNTER — CLINICAL SUPPORT (OUTPATIENT)
Dept: ORTHOPEDIC SURGERY | Facility: CLINIC | Age: 86
End: 2021-09-01

## 2021-09-01 VITALS — WEIGHT: 160 LBS | BODY MASS INDEX: 27.31 KG/M2 | TEMPERATURE: 97.5 F | HEIGHT: 64 IN

## 2021-09-01 DIAGNOSIS — M18.11 ARTHRITIS OF CARPOMETACARPAL (CMC) JOINT OF RIGHT THUMB: Primary | ICD-10-CM

## 2021-09-01 PROCEDURE — 20600 DRAIN/INJ JOINT/BURSA W/O US: CPT | Performed by: PHYSICIAN ASSISTANT

## 2021-09-01 RX ORDER — MECLIZINE HCL 12.5 MG/1
TABLET ORAL
COMMUNITY

## 2021-09-01 RX ORDER — ROSUVASTATIN CALCIUM 10 MG/1
TABLET, COATED ORAL
COMMUNITY

## 2021-09-01 RX ORDER — METOPROLOL SUCCINATE 25 MG/1
25 TABLET, EXTENDED RELEASE ORAL DAILY
COMMUNITY
Start: 2021-08-16

## 2021-09-01 RX ORDER — FUROSEMIDE 20 MG/1
TABLET ORAL
COMMUNITY
Start: 2021-06-30

## 2021-09-01 RX ORDER — UBIDECARENONE 100 MG
CAPSULE ORAL
COMMUNITY

## 2021-09-01 RX ORDER — LEVOTHYROXINE SODIUM 0.05 MG/1
50 TABLET ORAL DAILY
COMMUNITY
Start: 2021-07-20

## 2021-09-01 RX ORDER — ASPIRIN 81 MG/1
TABLET ORAL
COMMUNITY

## 2021-09-01 NOTE — PROGRESS NOTES
"Chief Complaint  Pain and Follow-up of the Right Hand and Injections    Subjective    History of Present Illness      Macie Miller is a 89 y.o. female who presents to [facility] for is here today for injection therapy. She receives  RIGHT thumb CMC injections of Depo-Medrol. Since last injection, patient notes great relief of symptoms. Treatment options have been discussed and she understands and consents. She also mentions she is having numbness and tingling in her hands and feet.       Objective   Vital Signs:   Temp 97.5 °F (36.4 °C)   Ht 162.6 cm (64.02\")   Wt 72.6 kg (160 lb)   BMI 27.45 kg/m²     Physical Exam  89 y.o. female is awake, alert, oriented, in no acute distress and well developed, well nourished.  Ortho Exam   RIGHT thumb  Positive for tenderness at base of the 1st metacarpal   Positive for swelling of skin and soft tissues   Flexor and extensor tendon function is well preserved.   Capillary refill is 2 seconds with a brisk return.   Neurovascular status is intact.  Positive for crepitus and pain with axial loading of the joint   Pinch strength is compromised  Slight subluxation of the 1st metacarpal base is noted.       PROCEDURE  Small Joint Arthrocentesis: R thumb CMC  Consent given by: patient  Site marked: site marked  Timeout: Immediately prior to procedure a time out was called to verify the correct patient, procedure, equipment, support staff and site/side marked as required   Supporting Documentation  Indications: pain   Procedure Details  Location: thumb - R thumb CMC  Preparation: Patient was prepped and draped in the usual sterile fashion  Needle size: 27 G  Medications administered: 40 mg methylPREDNISolone acetate 80 MG/ML; 1 mL Lidocaine HCl (Cardiac)  MG/5ML  Patient tolerance: patient tolerated the procedure well with no immediate complications      Injection site was identified by physical examination and cleaned with Betadine and " alcohol swabs. Prior to needle insertion, ethyl chloride spray was used for surface anesthesia. Sterile technique was used.       Assessment   Assessment and Plan    Problem List Items Addressed This Visit        Musculoskeletal and Injuries    Arthritis of carpometacarpal (CMC) joint of right thumb - Primary    Relevant Orders    Small Joint Arthrocentesis: R thumb CMC          Follow Up   • Right CMC-thumb Depo-Medrol injection was discussed with the patient. Discussed indication, risks, benefits, and alternatives. Verbal consent was given to proceed with the procedure.   • Injection was performed from dorsal approach.  Patient tolerated the procedure well and no complications were encountered.  • Discussion of orthopedic goals and activities and patient/guardian expressed understanding.  • Ice, heat, rest, compression and elevation of extremity as beneficial  • nsaids and/or tylenol as beneficial  • Instructed to refrain from heavy activity/rest the extremity for the next 24-48 hours  • Discussion regarding possibility of cortisol flare and what to expect if this occurs  • Watch for signs and symptoms of infection  • Call if adverse effect from injection therapy  • Follow up in 3 months  • Advised her to follow up with PCP or neurologist for evaluation of the bilateral feet and hand symptoms.   • Patient was given instructions and counseling regarding her condition or for health maintenance advice. Please see specific information pulled into the AVS if appropriate.     Jaiden Seo PA-C   Date of Encounter: 9/1/2021   Electronically signed by Jaiden Seo PA-C, 09/02/21, 1:54 PM EDT.     EMR Dragon/Transcription disclaimer:  Much of this encounter note is an electronic transcription/translation of spoken language to printed text. The electronic translation of spoken language may permit erroneous, or at times, nonsensical words or phrases to be inadvertently transcribed; Although I have reviewed  the note for such errors, some may still exist.

## 2021-09-02 RX ORDER — LIDOCAINE HYDROCHLORIDE 20 MG/ML
1 INJECTION, SOLUTION INTRAVENOUS
Status: COMPLETED | OUTPATIENT
Start: 2021-09-02 | End: 2021-09-02

## 2021-09-02 RX ORDER — METHYLPREDNISOLONE ACETATE 80 MG/ML
40 INJECTION, SUSPENSION INTRA-ARTICULAR; INTRALESIONAL; INTRAMUSCULAR; SOFT TISSUE
Status: COMPLETED | OUTPATIENT
Start: 2021-09-02 | End: 2021-09-02

## 2021-09-02 RX ADMIN — METHYLPREDNISOLONE ACETATE 40 MG: 80 INJECTION, SUSPENSION INTRA-ARTICULAR; INTRALESIONAL; INTRAMUSCULAR; SOFT TISSUE at 13:54

## 2021-09-02 RX ADMIN — LIDOCAINE HYDROCHLORIDE 1 ML: 20 INJECTION, SOLUTION INTRAVENOUS at 13:54

## 2021-11-29 ENCOUNTER — TELEPHONE (OUTPATIENT)
Dept: ORTHOPEDIC SURGERY | Facility: CLINIC | Age: 86
End: 2021-11-29

## 2021-11-29 NOTE — TELEPHONE ENCOUNTER
Caller: PATIENT    Relationship to patient: SELF    Best call back number: [phone]    Chief complaint: RIGHT HAND    Type of visit: INJECTION    Requested date: SOMETIME IN January     If rescheduling, when is the original appointment: 12.01.21    Additional notes: PATIENT WAS CALLING TO RESCHEDULE HER INJECTION WITH MSRenaldo TANGELA THAT IS SCHEDULED FOR 12.01.21.

## 2022-05-16 ENCOUNTER — TELEPHONE (OUTPATIENT)
Dept: NEUROSURGERY | Facility: CLINIC | Age: 87
End: 2022-05-16

## 2022-05-16 NOTE — TELEPHONE ENCOUNTER
PT DAUGHTER CALLED, ASKING IF DR. ORNELAS IS TAKING NEW PATIENT. INFORMED YES.      TOLD DAUGHTER FOR PT TO BE SEEN, A REFERRAL IS REQUIRED FROM A PROVIDER.  (PCP, GASTRO, CARDIO.)     WE WOULD NEED PROGRESS NOTE FROM THE REFERRING PROVIDER.  WE WILL ALSO NEED IMAGING, PREFERABLY A MRI IN ORDER TO SEE A DRRenaldo ,BUT IF A MRI IS NOT ABLE TO BE OBTAINED, A X-RAY OR CT SCAN CAN GET THE PATIENT IN TO SEE MID-LEVEL PROVIDER (LYNDSAY ARMIJO.)      DAUGHTER STATES WILL FOLLOW UP WITH PCP.

## 2022-10-20 ENCOUNTER — CLINICAL SUPPORT (OUTPATIENT)
Dept: ORTHOPEDIC SURGERY | Facility: CLINIC | Age: 87
End: 2022-10-20

## 2022-10-20 VITALS — BODY MASS INDEX: 25.78 KG/M2 | HEIGHT: 64 IN | TEMPERATURE: 97.7 F | WEIGHT: 151 LBS

## 2022-10-20 DIAGNOSIS — M79.641 RIGHT HAND PAIN: Primary | ICD-10-CM

## 2022-10-20 DIAGNOSIS — M79.642 LEFT HAND PAIN: ICD-10-CM

## 2022-10-20 PROCEDURE — 20600 DRAIN/INJ JOINT/BURSA W/O US: CPT | Performed by: ORTHOPAEDIC SURGERY

## 2022-10-20 RX ADMIN — LIDOCAINE HYDROCHLORIDE 2 ML: 20 INJECTION, SOLUTION INTRAVENOUS at 15:02

## 2022-10-20 RX ADMIN — METHYLPREDNISOLONE ACETATE 160 MG: 80 INJECTION, SUSPENSION INTRA-ARTICULAR; INTRALESIONAL; INTRAMUSCULAR; SOFT TISSUE at 15:02

## 2022-10-20 NOTE — PROGRESS NOTES
"Chief Complaint  Follow-up of the Right Hand    Subjective    History of Present Illness      Macie Miller is a 89 y.o. female who presents to [facility] for Patient is seen in the office today for bilateral finger pain at thumb CMC. The pain is over the Dorsal aspect . It has been progressive in nature but remains intermittent . Worsened by repetitive motion using keyboard. Patient denies numbness and tingling. has had improvement in the past with heat, NSAIDS and injections.        Objective   Vital Signs:   Temp 97.7 °F (36.5 °C)   Ht 162.6 cm (64\")   Wt 68.5 kg (151 lb)   BMI 25.92 kg/m²     Physical Exam  89 y.o. female is awake, alert, oriented, in no acute distress and well developed, well nourished.  Ortho Exam   RIGHT hand  Right hand-CMC joint. The patient is right hand dominant. Tenderness is present at base of the 1st metacarpal. Skin and soft tissues are mildly swollen. Flexor and extensor tendon function is well preserved. Capillary refill is 2 seconds with a brisk return. Axial lading of the joint causes crepitus and pain. Pinch strength is compromised. Slight subluxation of the 1st metacarpal base is noted. Neurovascular status is intact.      Result Review :                  Small Joint Arthrocentesis: R thumb CMC  Consent given by: patient  Site marked: site marked  Timeout: Immediately prior to procedure a time out was called to verify the correct patient, procedure, equipment, support staff and site/side marked as required   Supporting Documentation  Indications: pain   Procedure Details  Location: thumb - R thumb CMC  Preparation: Patient was prepped and draped in the usual sterile fashion  Needle size: 27 G  Medications administered: 160 mg methylPREDNISolone acetate 80 MG/ML; 2 mL Lidocaine HCl (Cardiac)  MG/5ML  Patient tolerance: patient tolerated the procedure well with no immediate complications               Assessment   Assessment and Plan  "   Problem List Items Addressed This Visit        Musculoskeletal and Injuries    Right hand pain - Primary    Relevant Orders    Small Joint Arthrocentesis: R thumb CMC (Completed)    Left hand pain    Relevant Orders    Small Joint Arthrocentesis: R thumb CMC (Completed)       Follow Up   · Injected patient's right CMC-thumb joint(s)with Depo-Medrol from an dorsal approach   · Compression/brace   · Rest, ice, compression, and elevation (RICE) therapy  · OTC Tylenol 500-1000mg by mouth every 6 hours as needed for pain   · Follow up in 3 month(s)  • Patient was given instructions and counseling regarding her condition or for health maintenance advice. Please see specific information pulled into the AVS if appropriate.     Johnathan Landaverde MD   Date of Encounter: 10/20/2022   .     EMR Dragon/Transcription disclaimer:  Much of this encounter note is an electronic transcription/translation of spoken language to printed text. The electronic translation of spoken language may permit erroneous, or at times, nonsensical words or phrases to be inadvertently transcribed; Although I have reviewed the note for such errors, some may still exist.

## 2022-10-27 PROBLEM — M79.641 RIGHT HAND PAIN: Status: ACTIVE | Noted: 2022-10-27

## 2022-10-27 PROBLEM — M79.642 LEFT HAND PAIN: Status: ACTIVE | Noted: 2022-10-27

## 2022-10-27 RX ORDER — LIDOCAINE HYDROCHLORIDE 20 MG/ML
2 INJECTION, SOLUTION INTRAVENOUS
Status: COMPLETED | OUTPATIENT
Start: 2022-10-20 | End: 2022-10-20

## 2022-10-27 RX ORDER — METHYLPREDNISOLONE ACETATE 80 MG/ML
160 INJECTION, SUSPENSION INTRA-ARTICULAR; INTRALESIONAL; INTRAMUSCULAR; SOFT TISSUE
Status: COMPLETED | OUTPATIENT
Start: 2022-10-20 | End: 2022-10-20

## 2023-01-23 ENCOUNTER — CLINICAL SUPPORT (OUTPATIENT)
Dept: ORTHOPEDIC SURGERY | Facility: CLINIC | Age: 88
End: 2023-01-23
Payer: MEDICARE

## 2023-01-23 VITALS — WEIGHT: 150 LBS | BODY MASS INDEX: 26.58 KG/M2 | TEMPERATURE: 97.2 F | HEIGHT: 63 IN

## 2023-01-23 DIAGNOSIS — M18.11 ARTHRITIS OF CARPOMETACARPAL (CMC) JOINT OF RIGHT THUMB: Primary | ICD-10-CM

## 2023-01-23 PROCEDURE — 20600 DRAIN/INJ JOINT/BURSA W/O US: CPT | Performed by: PHYSICIAN ASSISTANT

## 2023-01-23 RX ORDER — LIDOCAINE HYDROCHLORIDE 10 MG/ML
0.5 INJECTION, SOLUTION EPIDURAL; INFILTRATION; INTRACAUDAL; PERINEURAL
Status: COMPLETED | OUTPATIENT
Start: 2023-01-23 | End: 2023-01-23

## 2023-01-23 RX ORDER — METHYLPREDNISOLONE ACETATE 40 MG/ML
40 INJECTION, SUSPENSION INTRA-ARTICULAR; INTRALESIONAL; INTRAMUSCULAR; SOFT TISSUE
Status: COMPLETED | OUTPATIENT
Start: 2023-01-23 | End: 2023-01-23

## 2023-01-23 RX ADMIN — METHYLPREDNISOLONE ACETATE 40 MG: 40 INJECTION, SUSPENSION INTRA-ARTICULAR; INTRALESIONAL; INTRAMUSCULAR; SOFT TISSUE at 13:17

## 2023-01-23 RX ADMIN — LIDOCAINE HYDROCHLORIDE 0.5 ML: 10 INJECTION, SOLUTION EPIDURAL; INFILTRATION; INTRACAUDAL; PERINEURAL at 13:17

## 2023-01-23 NOTE — PROGRESS NOTES
"Chief Complaint  Follow-up of the Right Hand    Subjective    History of Present Illness      Macie Miller is a 89 y.o. female who presents to [facility] for is here today for injection therapy. She receives  RIGHT thumb CMC injections of Depo-Medrol. Since last injection, patient notes great relief of symptoms. Her last injection was in October, with Dr. Landaverde.  Treatment options have been discussed and she understands and consents.         Objective   Vital Signs:   Temp 97.2 °F (36.2 °C)   Ht 160 cm (63\")   Wt 68 kg (150 lb)   BMI 26.57 kg/m²     Physical Exam  89 y.o. female is awake, alert, oriented, in no acute distress and well developed, well nourished.  Ortho Exam   RIGHT thumb  Positive for tenderness at base of the 1st metacarpal   Positive for swelling of skin and soft tissues   Flexor and extensor tendon function is well preserved.   Capillary refill is 2 seconds with a brisk return.   Neurovascular status is intact.  Positive for crepitus and pain with axial loading of the joint   Pinch strength is compromised  Slight subluxation of the 1st metacarpal base is noted.       PROCEDURE  Small Joint Arthrocentesis: R thumb CMC  Consent given by: patient  Site marked: site marked  Timeout: Immediately prior to procedure a time out was called to verify the correct patient, procedure, equipment, support staff and site/side marked as required   Supporting Documentation  Indications: pain   Procedure Details  Location: thumb - R thumb CMC  Needle size: 27 G  Medications administered: 0.5 mL lidocaine PF 1% 1 %; 40 mg methylPREDNISolone acetate 40 MG/ML  Patient tolerance: patient tolerated the procedure well with no immediate complications      Injection site was identified by physical examination and cleaned with Betadine and alcohol swabs. Prior to needle insertion, ethyl chloride spray was used for surface anesthesia. Sterile technique was used.       Assessment "   Assessment and Plan    Problem List Items Addressed This Visit        Musculoskeletal and Injuries    Arthritis of carpometacarpal (CMC) joint of right thumb - Primary    Relevant Orders    Small Joint Arthrocentesis: R thumb CMC       Follow Up   • Right CMC-thumb Depo-Medrol injection was discussed with the patient. Discussed indication, risks, benefits, and alternatives. Verbal consent was given to proceed with the procedure.   • Injection was performed from dorsal approach.  Patient tolerated the procedure well and no complications were encountered.  • Discussion of orthopedic goals and activities and patient/guardian expressed understanding.  • Ice, heat, rest, compression and elevation of extremity as beneficial  • nsaids and/or tylenol as beneficial  • Instructed to refrain from heavy activity/rest the extremity for the next 24-48 hours  • Discussion regarding possibility of cortisol flare and what to expect if this occurs  • Watch for signs and symptoms of infection  • Call if adverse effect from injection therapy  • Follow up in 3 months  • Patient was given instructions and counseling regarding her condition or for health maintenance advice. Please see specific information pulled into the AVS if appropriate.     Jaiden Seo PA-C   Date of Encounter: 1/23/2023   Electronically signed by Jaiden Seo PA-C, 01/23/23, 1:38 PM EST.     EMR Dragon/Transcription disclaimer:  Much of this encounter note is an electronic transcription/translation of spoken language to printed text. The electronic translation of spoken language may permit erroneous, or at times, nonsensical words or phrases to be inadvertently transcribed; Although I have reviewed the note for such errors, some may still exist.

## 2023-04-26 ENCOUNTER — CLINICAL SUPPORT (OUTPATIENT)
Dept: ORTHOPEDIC SURGERY | Facility: CLINIC | Age: 88
End: 2023-04-26
Payer: MEDICARE

## 2023-04-26 VITALS — WEIGHT: 150 LBS | HEIGHT: 63 IN | TEMPERATURE: 98.6 F | BODY MASS INDEX: 26.58 KG/M2

## 2023-04-26 DIAGNOSIS — M18.11 ARTHRITIS OF CARPOMETACARPAL (CMC) JOINT OF RIGHT THUMB: Primary | ICD-10-CM

## 2023-04-26 RX ORDER — LIDOCAINE HYDROCHLORIDE 10 MG/ML
0.5 INJECTION, SOLUTION EPIDURAL; INFILTRATION; INTRACAUDAL; PERINEURAL
Status: COMPLETED | OUTPATIENT
Start: 2023-04-26 | End: 2023-04-26

## 2023-04-26 RX ORDER — METHYLPREDNISOLONE ACETATE 80 MG/ML
80 INJECTION, SUSPENSION INTRA-ARTICULAR; INTRALESIONAL; INTRAMUSCULAR; SOFT TISSUE
Status: COMPLETED | OUTPATIENT
Start: 2023-04-26 | End: 2023-04-26

## 2023-04-26 RX ADMIN — LIDOCAINE HYDROCHLORIDE 0.5 ML: 10 INJECTION, SOLUTION EPIDURAL; INFILTRATION; INTRACAUDAL; PERINEURAL at 13:16

## 2023-04-26 RX ADMIN — METHYLPREDNISOLONE ACETATE 80 MG: 80 INJECTION, SUSPENSION INTRA-ARTICULAR; INTRALESIONAL; INTRAMUSCULAR; SOFT TISSUE at 13:16

## 2023-04-26 NOTE — PROGRESS NOTES
"Chief Complaint  Follow-up and Pain of the Right Hand (RIGHT CMC JOINT INJECTION (THUMB))    Subjective    History of Present Illness      Macie Miller is a 89 y.o. female who presents to [facility] for is here today for injection therapy. She receives  RIGHT thumb CMC injections of Depo-Medrol. Since last injection, patient notes moderate relief of symptoms, the injection did not seem to last as long. The injection before last seemed to work better, which there was a difference in steroid dosage. Treatment options have been discussed and she understands and consents.         Objective   Vital Signs:   Temp 98.6 °F (37 °C)   Ht 160 cm (63\")   Wt 68 kg (150 lb)   BMI 26.57 kg/m²     Physical Exam  89 y.o. female is awake, alert, oriented, in no acute distress and well developed, well nourished.  Ortho Exam   RIGHT thumb  Positive for tenderness at base of the 1st metacarpal   Positive for swelling of skin and soft tissues   Flexor and extensor tendon function is well preserved.   Capillary refill is 2 seconds with a brisk return.   Neurovascular status is intact.  Positive for crepitus and pain with axial loading of the joint   Pinch strength is compromised  Slight subluxation of the 1st metacarpal base is noted.       PROCEDURE  Small Joint Arthrocentesis: R thumb CMC  Consent given by: patient  Site marked: site marked  Timeout: Immediately prior to procedure a time out was called to verify the correct patient, procedure, equipment, support staff and site/side marked as required   Supporting Documentation  Indications: pain   Procedure Details  Location: thumb - R thumb CMC  Preparation: Patient was prepped and draped in the usual sterile fashion  Needle size: 27 G  Approach: dorsal  Medications administered: 0.5 mL lidocaine PF 1% 1 %; 80 mg methylPREDNISolone acetate 80 MG/ML  Patient tolerance: patient tolerated the procedure well with no immediate " complications      Injection site was identified by physical examination and cleaned with Betadine and alcohol swabs. Prior to needle insertion, ethyl chloride spray was used for surface anesthesia. Sterile technique was used.       Assessment   Assessment and Plan    Problem List Items Addressed This Visit        Musculoskeletal and Injuries    Arthritis of carpometacarpal (CMC) joint of right thumb - Primary    Relevant Orders    Small Joint Arthrocentesis: R thumb CMC       Follow Up   • Right CMC-thumb Depo-Medrol injection was discussed with the patient. Discussed indication, risks, benefits, and alternatives. Verbal consent was given to proceed with the procedure.   • Injection was performed from dorsal approach.  Patient tolerated the procedure well and no complications were encountered.  • Discussion of orthopedic goals and activities and patient/guardian expressed understanding.  • Ice, heat, rest, compression and elevation of extremity as beneficial  • nsaids and/or tylenol as beneficial  • Instructed to refrain from heavy activity/rest the extremity for the next 24-48 hours  • Discussion regarding possibility of cortisol flare and what to expect if this occurs  • Watch for signs and symptoms of infection  • Call if adverse effect from injection therapy  • Follow up in 3 months  • Patient was given instructions and counseling regarding her condition or for health maintenance advice. Please see specific information pulled into the AVS if appropriate.     Jaiden Seo PA-C   Date of Encounter: 4/26/2023   Electronically signed by Jaiden Seo PA-C, 04/26/23, 1:16 PM EDT.     EMR Dragon/Transcription disclaimer:  Much of this encounter note is an electronic transcription/translation of spoken language to printed text. The electronic translation of spoken language may permit erroneous, or at times, nonsensical words or phrases to be inadvertently transcribed; Although I have reviewed the note  for such errors, some may still exist.

## 2023-08-20 ENCOUNTER — HOSPITAL ENCOUNTER (INPATIENT)
Facility: HOSPITAL | Age: 88
LOS: 2 days | Discharge: HOME | DRG: 390 | End: 2023-08-22
Attending: HOSPITALIST | Admitting: HOSPITALIST
Payer: MEDICARE

## 2023-08-20 DIAGNOSIS — R26.2 DIFFICULTY WALKING: ICD-10-CM

## 2023-08-20 DIAGNOSIS — Z78.9 DECREASED ACTIVITIES OF DAILY LIVING (ADL): ICD-10-CM

## 2023-08-20 DIAGNOSIS — K56.600 PARTIAL SMALL BOWEL OBSTRUCTION: Primary | ICD-10-CM

## 2023-08-20 PROBLEM — K56.609 SBO (SMALL BOWEL OBSTRUCTION): Status: ACTIVE | Noted: 2023-08-20

## 2023-08-20 LAB
ANION GAP SERPL CALCULATED.3IONS-SCNC: 10 MMOL/L (ref 5–15)
BASOPHILS # BLD AUTO: 0.01 10*3/MM3 (ref 0–0.2)
BASOPHILS NFR BLD AUTO: 0.1 % (ref 0–1.5)
BUN SERPL-MCNC: 17 MG/DL (ref 8–23)
BUN/CREAT SERPL: 15.2 (ref 7–25)
CALCIUM SPEC-SCNC: 9.4 MG/DL (ref 8.2–9.6)
CHLORIDE SERPL-SCNC: 99 MMOL/L (ref 98–107)
CO2 SERPL-SCNC: 25 MMOL/L (ref 22–29)
CREAT SERPL-MCNC: 1.12 MG/DL (ref 0.57–1)
DEPRECATED RDW RBC AUTO: 41.6 FL (ref 37–54)
EGFRCR SERPLBLD CKD-EPI 2021: 46.2 ML/MIN/1.73
EOSINOPHIL # BLD AUTO: 0.16 10*3/MM3 (ref 0–0.4)
EOSINOPHIL NFR BLD AUTO: 2.3 % (ref 0.3–6.2)
ERYTHROCYTE [DISTWIDTH] IN BLOOD BY AUTOMATED COUNT: 12.7 % (ref 12.3–15.4)
GLUCOSE BLDC GLUCOMTR-MCNC: 126 MG/DL (ref 70–99)
GLUCOSE SERPL-MCNC: 143 MG/DL (ref 65–99)
HCT VFR BLD AUTO: 31.5 % (ref 34–46.6)
HGB BLD-MCNC: 10.4 G/DL (ref 12–15.9)
IMM GRANULOCYTES # BLD AUTO: 0.01 10*3/MM3 (ref 0–0.05)
IMM GRANULOCYTES NFR BLD AUTO: 0.1 % (ref 0–0.5)
LYMPHOCYTES # BLD AUTO: 1.1 10*3/MM3 (ref 0.7–3.1)
LYMPHOCYTES NFR BLD AUTO: 16 % (ref 19.6–45.3)
MAGNESIUM SERPL-MCNC: 2.1 MG/DL (ref 1.7–2.3)
MCH RBC QN AUTO: 29.4 PG (ref 26.6–33)
MCHC RBC AUTO-ENTMCNC: 33 G/DL (ref 31.5–35.7)
MCV RBC AUTO: 89 FL (ref 79–97)
MONOCYTES # BLD AUTO: 0.42 10*3/MM3 (ref 0.1–0.9)
MONOCYTES NFR BLD AUTO: 6.1 % (ref 5–12)
NEUTROPHILS NFR BLD AUTO: 5.17 10*3/MM3 (ref 1.7–7)
NEUTROPHILS NFR BLD AUTO: 75.4 % (ref 42.7–76)
NRBC BLD AUTO-RTO: 0 /100 WBC (ref 0–0.2)
PHOSPHATE SERPL-MCNC: 3.1 MG/DL (ref 2.5–4.5)
PLATELET # BLD AUTO: 204 10*3/MM3 (ref 140–450)
PMV BLD AUTO: 9.9 FL (ref 6–12)
POTASSIUM SERPL-SCNC: 4.3 MMOL/L (ref 3.5–5.2)
RBC # BLD AUTO: 3.54 10*6/MM3 (ref 3.77–5.28)
SODIUM SERPL-SCNC: 134 MMOL/L (ref 136–145)
WBC NRBC COR # BLD: 6.87 10*3/MM3 (ref 3.4–10.8)

## 2023-08-20 PROCEDURE — 93010 ELECTROCARDIOGRAM REPORT: CPT | Performed by: INTERNAL MEDICINE

## 2023-08-20 PROCEDURE — 94640 AIRWAY INHALATION TREATMENT: CPT

## 2023-08-20 PROCEDURE — 80048 BASIC METABOLIC PNL TOTAL CA: CPT | Performed by: HOSPITALIST

## 2023-08-20 PROCEDURE — 93005 ELECTROCARDIOGRAM TRACING: CPT | Performed by: HOSPITALIST

## 2023-08-20 PROCEDURE — 25010000002 ENOXAPARIN PER 10 MG: Performed by: HOSPITALIST

## 2023-08-20 PROCEDURE — 99221 1ST HOSP IP/OBS SF/LOW 40: CPT | Performed by: SURGERY

## 2023-08-20 PROCEDURE — 83735 ASSAY OF MAGNESIUM: CPT | Performed by: HOSPITALIST

## 2023-08-20 PROCEDURE — 82948 REAGENT STRIP/BLOOD GLUCOSE: CPT

## 2023-08-20 PROCEDURE — 25010000002 METOCLOPRAMIDE PER 10 MG: Performed by: HOSPITALIST

## 2023-08-20 PROCEDURE — 84100 ASSAY OF PHOSPHORUS: CPT | Performed by: HOSPITALIST

## 2023-08-20 PROCEDURE — 99223 1ST HOSP IP/OBS HIGH 75: CPT | Performed by: HOSPITALIST

## 2023-08-20 PROCEDURE — 85025 COMPLETE CBC W/AUTO DIFF WBC: CPT | Performed by: HOSPITALIST

## 2023-08-20 PROCEDURE — 94799 UNLISTED PULMONARY SVC/PX: CPT

## 2023-08-20 RX ORDER — BISACODYL 10 MG
10 SUPPOSITORY, RECTAL RECTAL DAILY PRN
Status: DISCONTINUED | OUTPATIENT
Start: 2023-08-20 | End: 2023-08-22 | Stop reason: HOSPADM

## 2023-08-20 RX ORDER — SODIUM CHLORIDE 0.9 % (FLUSH) 0.9 %
10 SYRINGE (ML) INJECTION EVERY 12 HOURS SCHEDULED
Status: DISCONTINUED | OUTPATIENT
Start: 2023-08-20 | End: 2023-08-22 | Stop reason: HOSPADM

## 2023-08-20 RX ORDER — ALBUTEROL SULFATE 2.5 MG/3ML
2.5 SOLUTION RESPIRATORY (INHALATION) EVERY 6 HOURS PRN
Status: DISCONTINUED | OUTPATIENT
Start: 2023-08-20 | End: 2023-08-22 | Stop reason: HOSPADM

## 2023-08-20 RX ORDER — BUDESONIDE AND FORMOTEROL FUMARATE DIHYDRATE 160; 4.5 UG/1; UG/1
1 AEROSOL RESPIRATORY (INHALATION)
Status: DISCONTINUED | OUTPATIENT
Start: 2023-08-20 | End: 2023-08-22 | Stop reason: HOSPADM

## 2023-08-20 RX ORDER — PROMETHAZINE HYDROCHLORIDE 12.5 MG/1
12.5 SUPPOSITORY RECTAL EVERY 6 HOURS PRN
Status: DISCONTINUED | OUTPATIENT
Start: 2023-08-20 | End: 2023-08-22 | Stop reason: HOSPADM

## 2023-08-20 RX ORDER — SODIUM CHLORIDE 9 MG/ML
100 INJECTION, SOLUTION INTRAVENOUS CONTINUOUS
Status: DISCONTINUED | OUTPATIENT
Start: 2023-08-20 | End: 2023-08-20

## 2023-08-20 RX ORDER — BISACODYL 5 MG/1
5 TABLET, DELAYED RELEASE ORAL DAILY PRN
Status: DISCONTINUED | OUTPATIENT
Start: 2023-08-20 | End: 2023-08-22 | Stop reason: HOSPADM

## 2023-08-20 RX ORDER — ONDANSETRON 2 MG/ML
4 INJECTION INTRAMUSCULAR; INTRAVENOUS EVERY 6 HOURS PRN
Status: DISCONTINUED | OUTPATIENT
Start: 2023-08-20 | End: 2023-08-22 | Stop reason: HOSPADM

## 2023-08-20 RX ORDER — PROMETHAZINE HYDROCHLORIDE 12.5 MG/1
12.5 TABLET ORAL EVERY 6 HOURS PRN
Status: DISCONTINUED | OUTPATIENT
Start: 2023-08-20 | End: 2023-08-22 | Stop reason: HOSPADM

## 2023-08-20 RX ORDER — INSULIN LISPRO 100 [IU]/ML
2-9 INJECTION, SOLUTION INTRAVENOUS; SUBCUTANEOUS
Status: DISCONTINUED | OUTPATIENT
Start: 2023-08-20 | End: 2023-08-22 | Stop reason: HOSPADM

## 2023-08-20 RX ORDER — SODIUM CHLORIDE 9 MG/ML
40 INJECTION, SOLUTION INTRAVENOUS AS NEEDED
Status: DISCONTINUED | OUTPATIENT
Start: 2023-08-20 | End: 2023-08-22 | Stop reason: HOSPADM

## 2023-08-20 RX ORDER — ONDANSETRON 4 MG/1
4 TABLET, FILM COATED ORAL EVERY 6 HOURS PRN
Status: DISCONTINUED | OUTPATIENT
Start: 2023-08-20 | End: 2023-08-22 | Stop reason: HOSPADM

## 2023-08-20 RX ORDER — DEXTROSE MONOHYDRATE 25 G/50ML
25 INJECTION, SOLUTION INTRAVENOUS
Status: DISCONTINUED | OUTPATIENT
Start: 2023-08-20 | End: 2023-08-22 | Stop reason: HOSPADM

## 2023-08-20 RX ORDER — HYDRALAZINE HYDROCHLORIDE 20 MG/ML
10 INJECTION INTRAMUSCULAR; INTRAVENOUS EVERY 6 HOURS PRN
Status: DISCONTINUED | OUTPATIENT
Start: 2023-08-20 | End: 2023-08-22 | Stop reason: HOSPADM

## 2023-08-20 RX ORDER — SODIUM CHLORIDE 0.9 % (FLUSH) 0.9 %
10 SYRINGE (ML) INJECTION AS NEEDED
Status: DISCONTINUED | OUTPATIENT
Start: 2023-08-20 | End: 2023-08-22 | Stop reason: HOSPADM

## 2023-08-20 RX ORDER — SODIUM CHLORIDE 9 MG/ML
100 INJECTION, SOLUTION INTRAVENOUS CONTINUOUS
Status: DISCONTINUED | OUTPATIENT
Start: 2023-08-20 | End: 2023-08-21

## 2023-08-20 RX ORDER — ENOXAPARIN SODIUM 100 MG/ML
40 INJECTION SUBCUTANEOUS DAILY
Status: DISCONTINUED | OUTPATIENT
Start: 2023-08-20 | End: 2023-08-20 | Stop reason: DRUGHIGH

## 2023-08-20 RX ORDER — ENOXAPARIN SODIUM 100 MG/ML
30 INJECTION SUBCUTANEOUS DAILY
Status: DISCONTINUED | OUTPATIENT
Start: 2023-08-20 | End: 2023-08-22 | Stop reason: HOSPADM

## 2023-08-20 RX ORDER — METOCLOPRAMIDE HYDROCHLORIDE 5 MG/ML
5 INJECTION INTRAMUSCULAR; INTRAVENOUS EVERY 6 HOURS
Status: DISCONTINUED | OUTPATIENT
Start: 2023-08-20 | End: 2023-08-22 | Stop reason: HOSPADM

## 2023-08-20 RX ORDER — NICOTINE POLACRILEX 4 MG
15 LOZENGE BUCCAL
Status: DISCONTINUED | OUTPATIENT
Start: 2023-08-20 | End: 2023-08-22 | Stop reason: HOSPADM

## 2023-08-20 RX ORDER — POLYETHYLENE GLYCOL 3350 17 G/17G
17 POWDER, FOR SOLUTION ORAL DAILY PRN
Status: DISCONTINUED | OUTPATIENT
Start: 2023-08-20 | End: 2023-08-22 | Stop reason: HOSPADM

## 2023-08-20 RX ORDER — CHOLECALCIFEROL (VITAMIN D3) 125 MCG
5 CAPSULE ORAL NIGHTLY PRN
Status: DISCONTINUED | OUTPATIENT
Start: 2023-08-20 | End: 2023-08-22 | Stop reason: HOSPADM

## 2023-08-20 RX ORDER — AMOXICILLIN 250 MG
2 CAPSULE ORAL 2 TIMES DAILY
Status: DISCONTINUED | OUTPATIENT
Start: 2023-08-20 | End: 2023-08-22 | Stop reason: HOSPADM

## 2023-08-20 RX ADMIN — Medication 10 ML: at 16:33

## 2023-08-20 RX ADMIN — Medication 10 ML: at 20:22

## 2023-08-20 RX ADMIN — METOCLOPRAMIDE HYDROCHLORIDE 5 MG: 5 INJECTION INTRAMUSCULAR; INTRAVENOUS at 20:28

## 2023-08-20 RX ADMIN — BUDESONIDE AND FORMOTEROL FUMARATE DIHYDRATE 1 PUFF: 160; 4.5 AEROSOL RESPIRATORY (INHALATION) at 20:57

## 2023-08-20 RX ADMIN — SODIUM CHLORIDE 100 ML/HR: 9 INJECTION, SOLUTION INTRAVENOUS at 23:13

## 2023-08-20 RX ADMIN — ENOXAPARIN SODIUM 30 MG: 100 INJECTION SUBCUTANEOUS at 20:22

## 2023-08-20 RX ADMIN — Medication 10 ML: at 20:23

## 2023-08-20 RX ADMIN — SODIUM CHLORIDE 100 ML/HR: 9 INJECTION, SOLUTION INTRAVENOUS at 16:33

## 2023-08-20 RX ADMIN — SENNOSIDES AND DOCUSATE SODIUM 2 TABLET: 50; 8.6 TABLET ORAL at 20:23

## 2023-08-20 NOTE — CONSULTS
Chief Complaint: No chief complaint on file.    Subjective         History of Present Illness  Macie Miller is a 89 y.o. female presents to [facility] to be seen for partial SBO.  She was transferred from an OSH after she was found to have a pSBO on ct scan.   She is having bowel movements and has not had any n/v.  Per the patient she has had several bowel obstructions that resolve without surgical intervention.  She does not want an NGT at this time.     Objective     Past Medical History:   Diagnosis Date    Arthritis     Disease of thyroid gland     Hypertension        Past Surgical History:   Procedure Laterality Date    ABDOMINAL SURGERY      CARPAL TUNNEL RELEASE Right 09/13/2019    Dr. Johnathan Landaverde    HERNIA REPAIR      JOINT REPLACEMENT      KNEE SURGERY Bilateral     knee replacement    TONSILLECTOMY           Current Facility-Administered Medications:     albuterol (PROVENTIL) nebulizer solution 0.083% 2.5 mg/3mL, 2.5 mg, Nebulization, Q6H PRN, Hernandez, Dimpi, DO    sennosides-docusate (PERICOLACE) 8.6-50 MG per tablet 2 tablet, 2 tablet, Oral, BID **AND** polyethylene glycol (MIRALAX) packet 17 g, 17 g, Oral, Daily PRN **AND** bisacodyl (DULCOLAX) EC tablet 5 mg, 5 mg, Oral, Daily PRN **AND** bisacodyl (DULCOLAX) suppository 10 mg, 10 mg, Rectal, Daily PRN, Hernandez, Dimpi, DO    budesonide-formoterol (SYMBICORT) 160-4.5 MCG/ACT inhaler 1 puff, 1 puff, Inhalation, BID - RT, Hernandez, Dimpi, DO    Calcium Replacement - Follow Nurse / BPA Driven Protocol, , Does not apply, PRN, Hernandez, Dimpi, DO    dextrose (D50W) (25 g/50 mL) IV injection 25 g, 25 g, Intravenous, Q15 Min PRN, Hernandez, Dimpi, DO    dextrose (GLUTOSE) oral gel 15 g, 15 g, Oral, Q15 Min PRN, Hernandez, Dimpi, DO    Enoxaparin Sodium (LOVENOX) syringe 30 mg, 30 mg, Subcutaneous, Daily, Hernandez, Dimpi, DO    glucagon (GLUCAGEN) injection 1 mg, 1 mg, Intramuscular, Q15 Min PRN, Gracy Hernandez, DO    hydrALAZINE  (APRESOLINE) injection 10 mg, 10 mg, Intravenous, Q6H PRN, Hernandez, Dimpi, DO    HYDROmorphone (DILAUDID) injection 0.5 mg, 0.5 mg, Intravenous, Q2H PRN, Hernandez, Dimpi, DO    Insulin Lispro (humaLOG) injection 2-9 Units, 2-9 Units, Subcutaneous, 4x Daily AC & at Bedtime, Hernandez, Dimpi, DO    Magnesium Standard Dose Replacement - Follow Nurse / BPA Driven Protocol, , Does not apply, PRN, Hernandez, Dimpi, DO    melatonin tablet 5 mg, 5 mg, Oral, Nightly PRN, Hernandez, Dimpi, DO    metoclopramide (REGLAN) injection 5 mg, 5 mg, Intravenous, Q6H, Hernandez, Dimpi, DO    ondansetron (ZOFRAN) tablet 4 mg, 4 mg, Oral, Q6H PRN **OR** ondansetron (ZOFRAN) injection 4 mg, 4 mg, Intravenous, Q6H PRN, Hernandez, Dimpi, DO    Phosphorus Replacement - Follow Nurse / BPA Driven Protocol, , Does not apply, PRN, Hernandez, Dimpi, DO    Potassium Replacement - Follow Nurse / BPA Driven Protocol, , Does not apply, PRN, Hernandez, Dimpi, DO    promethazine (PHENERGAN) tablet 12.5 mg, 12.5 mg, Oral, Q6H PRN **OR** promethazine (PHENERGAN) suppository 12.5 mg, 12.5 mg, Rectal, Q6H PRN, Hernandez, Dimpi, DO    sodium chloride 0.9 % flush 10 mL, 10 mL, Intravenous, Q12H, Hernandez, Dimpi, DO, 10 mL at 08/20/23 1633    sodium chloride 0.9 % flush 10 mL, 10 mL, Intravenous, PRN, Hernandez, Dimpi, DO    sodium chloride 0.9 % flush 10 mL, 10 mL, Intravenous, Q12H, Hernandez, Dimpi, DO    sodium chloride 0.9 % flush 10 mL, 10 mL, Intravenous, PRN, Hernandez, Dimpi, DO    sodium chloride 0.9 % infusion 40 mL, 40 mL, Intravenous, PRN, Hernandez, Dimpi, DO    sodium chloride 0.9 % infusion 40 mL, 40 mL, Intravenous, PRN, Hernandez, Dimpi, DO    sodium chloride 0.9 % infusion, 100 mL/hr, Intravenous, Continuous, Hernandez, Dimpi, DO, Last Rate: 100 mL/hr at 08/20/23 1633, 100 mL/hr at 08/20/23 1633    sodium chloride 0.9 % infusion, 100 mL/hr, Intravenous, Continuous, Hernandez, Dimpi, DO    Allergies   Allergen Reactions    Ciprofloxacin Rash    Penicillins Rash        History reviewed. No pertinent family  "history.    Social History     Socioeconomic History    Marital status:    Tobacco Use    Smoking status: Never    Smokeless tobacco: Never   Vaping Use    Vaping Use: Never used   Substance and Sexual Activity    Alcohol use: No    Drug use: Never    Sexual activity: Never       Vital Signs:   /52 (BP Location: Left arm, Patient Position: Lying)   Pulse 74   Temp 97.7 øF (36.5 øC) (Oral)   Resp 18   Ht 160 cm (62.99\")   Wt 68 kg (149 lb 14.6 oz)   SpO2 94%   BMI 26.56 kg/mý    Review of Systems   Constitutional:  Negative for fatigue and fever.   HENT:  Negative for ear pain and sore throat.    Eyes:  Negative for blurred vision.   Respiratory:  Negative for cough and shortness of breath.    Cardiovascular:  Negative for chest pain and leg swelling.   Gastrointestinal:  Positive for abdominal pain. Negative for constipation, diarrhea, nausea and vomiting.   Musculoskeletal:  Negative for arthralgias and myalgias.   Skin:  Negative for rash and skin lesions.   Neurological:  Negative for weakness and headache.   Hematological:  Negative for adenopathy. Does not bruise/bleed easily.   Psychiatric/Behavioral:  Negative for sleep disturbance and depressed mood.      Physical Exam  Vitals and nursing note reviewed.   Constitutional:       General: She is not in acute distress.     Appearance: Normal appearance. She is well-developed.   HENT:      Head: Normocephalic and atraumatic.   Eyes:      Extraocular Movements: Extraocular movements intact.      Pupils: Pupils are equal, round, and reactive to light.   Cardiovascular:      Pulses: Normal pulses.   Pulmonary:      Effort: Pulmonary effort is normal. No retractions.      Breath sounds: Normal air entry. No wheezing.   Abdominal:      General: There is distension.      Palpations: Abdomen is soft.      Tenderness: There is no abdominal tenderness.      Hernia: No hernia is present.   Musculoskeletal:         General: No swelling or deformity.      " Cervical back: Neck supple.   Skin:     General: Skin is warm and dry.      Findings: No erythema.   Neurological:      General: No focal deficit present.      Mental Status: She is alert and oriented to person, place, and time.      Motor: Motor function is intact.   Psychiatric:         Mood and Affect: Mood normal.         Thought Content: Thought content normal.        Result Review :               Assessment and Plan    Diagnoses and all orders for this visit:    1. Partial small bowel obstruction (Primary)    Other orders  NPO for tonight   Will try clears taqueria and then can advance as tolerated.        This document has been electronically signed by Dayanara Gómez MD  August 20, 2023 18:46 EDT

## 2023-08-20 NOTE — PLAN OF CARE
Goal Outcome Evaluation:  Plan of Care Reviewed With: patient        Progress: no change          Patient arrived via EMS from [facility]. Alert and able to make needs known. Patient got up with staff and walked to the bathroom. IV fluids were applied and running at 100ml/hr. Family at bedside. Patient brought cane. Wears hearing aids and glasses. Wears dentures but did not bring them.

## 2023-08-20 NOTE — H&P
[facility]  Date: [facility]   Patient Name: Macie Miller  : 1931  MRN: [id number]  Primary Care Physician:  Demi Carlson APRN  Date of admission: 2023    Subjective nausea  Subjective     Chief Complaint: Nausea    HPI: Patient is a 89-year-old female who presents to the outside facility with nausea and no vomiting.  Patient came from [facility]. She is having multiple bowel movements.  However, her CT looks like a partial small bowel obstruction.  Patient has had multiple small bowel obstructions in the past.  She has had ventral hernia surgery in the past.      On arrival here, her temperature is 97.7, pulse of 74, respiratory rate is 18, blood pressure is 174/52, and she saturating 94% on room air.  On labs, she has a glucose of 143, creatinine of 1.12, sodium of 134.  Magnesium is 2.1, phosphorus is 3.1.  Hemoglobin is 10.4.  Personal History     Past Medical History:  Past Medical History:   Diagnosis Date    Arthritis     Disease of thyroid gland     Hypertension          Past Surgical History:  Past Surgical History:   Procedure Laterality Date    ABDOMINAL SURGERY      CARPAL TUNNEL RELEASE Right 2019    Dr. Johnathan Landaverde    HERNIA REPAIR      JOINT REPLACEMENT      KNEE SURGERY Bilateral     knee replacement    TONSILLECTOMY          Family History:   Breast Cancer-related family history is not on file.      Social History:   Social History     Socioeconomic History    Marital status:    Tobacco Use    Smoking status: Never    Smokeless tobacco: Never   Vaping Use    Vaping Use: Never used   Substance and Sexual Activity    Alcohol use: No    Drug use: Never    Sexual activity: Never         Home Medications:  EPINEPHrine, Fluticasone Furoate-Vilanterol, HYDROcodone-acetaminophen, Hydrocortisone (Perianal), Polyethylene Glycol 3350, Romosozumab-aqqg, albuterol sulfate HFA, aspirin, cefdinir, cetirizine, coenzyme Q10,  colestipol, diclofenac, dicyclomine, doxazosin, furosemide, glucose blood, hydroCHLOROthiazide, levothyroxine, lidocaine, meclizine, metFORMIN ER, metoprolol succinate XL, montelukast, nitrofurantoin (macrocrystal-monohydrate), pravastatin, predniSONE, rosuvastatin, traMADol, traZODone, and triamcinolone    Allergies:  Allergies   Allergen Reactions    Ciprofloxacin Rash    Penicillins Rash       Review of Systems   All systems were reviewed and negative except for: nausea     Objective   Objective     Vitals:   Temp:  [97.7 øF (36.5 øC)] 97.7 øF (36.5 øC)  Heart Rate:  [74] 74  Resp:  [18] 18  BP: (174)/(52) 174/52    Physical Exam   Physical Exam  HENT:      Head: Normocephalic and atraumatic.      Nose: Nose normal.      Mouth/Throat:      Mouth: Mucous membranes are moist. Mucous membranes are dry.   Eyes:      Extraocular Movements: Extraocular movements intact.      Pupils: Pupils are equal, round, and reactive to light.   Cardiovascular:      Rate and Rhythm: Normal rate and regular rhythm.      Pulses: Normal pulses.      Heart sounds: Normal heart sounds.   Abdominal:      General: Abdomen is flat.      Palpations: Abdomen is soft.   Musculoskeletal:         General: Normal range of motion.      Cervical back: Normal range of motion.   Skin:     General: Skin is warm and dry.   Neurological:      General: No focal deficit present.      Mental Status: She is alert and oriented to person, place, and time.   Psychiatric:         Mood and Affect: Mood normal.         Behavior: Behavior normal.        Result Review      Result Review:  I have personally reviewed the results from the time of this admission to 8/20/2023 18:37 EDT and agree with these findings:  [x]  Laboratory  []  Microbiology  [x]  Radiology  []  EKG/Telemetry   []  Cardiology/Vascular   []  Pathology  [x]  Old records  []  Other:    Lab Results (most recent)       Procedure Component Value Units Date/Time    Magnesium [[id number]]  (Normal)  Collected: 08/20/23 1616    Specimen: Blood from Arm, Right Updated: 08/20/23 1654     Magnesium 2.1 mg/dL     Basic Metabolic Panel [[id number]]  (Abnormal) Collected: 08/20/23 1616    Specimen: Blood from Arm, Right Updated: 08/20/23 1654     Glucose 143 mg/dL      BUN 17 mg/dL      Creatinine 1.12 mg/dL      Sodium 134 mmol/L      Potassium 4.3 mmol/L      Chloride 99 mmol/L      CO2 25.0 mmol/L      Calcium 9.4 mg/dL      BUN/Creatinine Ratio 15.2     Anion Gap 10.0 mmol/L      eGFR 46.2 mL/min/1.73     Narrative:      GFR Normal >60  Chronic Kidney Disease <60  Kidney Failure <15    The GFR formula is only valid for adults with stable renal function between ages 18 and 70.    Phosphorus [[id number]]  (Normal) Collected: 08/20/23 1616    Specimen: Blood from Arm, Right Updated: 08/20/23 1654     Phosphorus 3.1 mg/dL     CBC & Differential [[id number]]  (Abnormal) Collected: 08/20/23 1616    Specimen: Blood from Arm, Right Updated: 08/20/23 1630    Narrative:      The following orders were created for panel order CBC & Differential.  Procedure                               Abnormality         Status                     ---------                               -----------         ------                     CBC Auto Differential[[id number]]        Abnormal            Final result                 Please view results for these tests on the individual orders.    CBC Auto Differential [[id number]]  (Abnormal) Collected: 08/20/23 1616    Specimen: Blood from Arm, Right Updated: 08/20/23 1630     WBC 6.87 10*3/mm3      RBC 3.54 10*6/mm3      Hemoglobin 10.4 g/dL      Hematocrit 31.5 %      MCV 89.0 fL      MCH 29.4 pg      MCHC 33.0 g/dL      RDW 12.7 %      RDW-SD 41.6 fl      MPV 9.9 fL      Platelets 204 10*3/mm3      Neutrophil % 75.4 %      Lymphocyte % 16.0 %      Monocyte % 6.1 %      Eosinophil % 2.3 %      Basophil % 0.1 %      Immature Grans % 0.1 %      Neutrophils, Absolute 5.17 10*3/mm3      Lymphocytes, Absolute  1.10 10*3/mm3      Monocytes, Absolute 0.42 10*3/mm3      Eosinophils, Absolute 0.16 10*3/mm3      Basophils, Absolute 0.01 10*3/mm3      Immature Grans, Absolute 0.01 10*3/mm3      nRBC 0.0 /100 WBC             No radiology results for the last day    Assessment & Plan   Assessment / Plan   Assessment/Plan:   #1  Possible small bowel obstruction  -IV fluids, bowel rest, supportive care with Reglan and antiemetics  -Surgery consulted    #2 COPD continue Symbicort    #3 non-insulin diabetes cover with insulin sliding scale    #4 hypothyroidism hold Synthroid    #5 high blood pressure cover with hydralazine with holding parameters.  Will need to resume blood pressure medications.    #6 hyperlipidemia we will need to resume Crestor once patient can take oral intake       DVT prophylaxis:  Mechanical DVT prophylaxis orders are present.    CODE STATUS:         Admission Status:  I believe this patient meets inpatient status.    Electronically signed by Gracy Hernandez DO, 08/20/23, 6:37 PM EDT.

## 2023-08-20 NOTE — PLAN OF CARE
Patient has a partial SBO on CT scan.  History of multiple abdominal surgeries in the past.  Hemodynamically stable.  No vomiting, + nausea.  Having multiple BMS.  No leukocytosis. BMP wnl.  Hemoglobin is 10.  No NG tube placed.     Dr. Gómez agrees to consult.      Electronically signed by Gracy Hernandez DO, 08/20/23, 1:29 PM EDT.

## 2023-08-21 ENCOUNTER — APPOINTMENT (OUTPATIENT)
Dept: GENERAL RADIOLOGY | Facility: HOSPITAL | Age: 88
DRG: 390 | End: 2023-08-21
Payer: MEDICARE

## 2023-08-21 LAB
ANION GAP SERPL CALCULATED.3IONS-SCNC: 6.9 MMOL/L (ref 5–15)
BASOPHILS # BLD AUTO: 0.03 10*3/MM3 (ref 0–0.2)
BASOPHILS NFR BLD AUTO: 0.7 % (ref 0–1.5)
BUN SERPL-MCNC: 16 MG/DL (ref 8–23)
BUN/CREAT SERPL: 14.7 (ref 7–25)
CALCIUM SPEC-SCNC: 8.9 MG/DL (ref 8.2–9.6)
CHLORIDE SERPL-SCNC: 103 MMOL/L (ref 98–107)
CO2 SERPL-SCNC: 26.1 MMOL/L (ref 22–29)
CREAT SERPL-MCNC: 1.09 MG/DL (ref 0.57–1)
DEPRECATED RDW RBC AUTO: 42.5 FL (ref 37–54)
EGFRCR SERPLBLD CKD-EPI 2021: 47.8 ML/MIN/1.73
EOSINOPHIL # BLD AUTO: 0.24 10*3/MM3 (ref 0–0.4)
EOSINOPHIL NFR BLD AUTO: 5.3 % (ref 0.3–6.2)
ERYTHROCYTE [DISTWIDTH] IN BLOOD BY AUTOMATED COUNT: 12.8 % (ref 12.3–15.4)
GLUCOSE BLDC GLUCOMTR-MCNC: 110 MG/DL (ref 70–99)
GLUCOSE BLDC GLUCOMTR-MCNC: 119 MG/DL (ref 70–99)
GLUCOSE BLDC GLUCOMTR-MCNC: 126 MG/DL (ref 70–99)
GLUCOSE BLDC GLUCOMTR-MCNC: 145 MG/DL (ref 70–99)
GLUCOSE SERPL-MCNC: 103 MG/DL (ref 65–99)
HCT VFR BLD AUTO: 30 % (ref 34–46.6)
HGB BLD-MCNC: 9.7 G/DL (ref 12–15.9)
IMM GRANULOCYTES # BLD AUTO: 0.01 10*3/MM3 (ref 0–0.05)
IMM GRANULOCYTES NFR BLD AUTO: 0.2 % (ref 0–0.5)
LYMPHOCYTES # BLD AUTO: 1.25 10*3/MM3 (ref 0.7–3.1)
LYMPHOCYTES NFR BLD AUTO: 27.4 % (ref 19.6–45.3)
MAGNESIUM SERPL-MCNC: 2.1 MG/DL (ref 1.7–2.3)
MCH RBC QN AUTO: 29.3 PG (ref 26.6–33)
MCHC RBC AUTO-ENTMCNC: 32.3 G/DL (ref 31.5–35.7)
MCV RBC AUTO: 90.6 FL (ref 79–97)
MONOCYTES # BLD AUTO: 0.38 10*3/MM3 (ref 0.1–0.9)
MONOCYTES NFR BLD AUTO: 8.3 % (ref 5–12)
NEUTROPHILS NFR BLD AUTO: 2.66 10*3/MM3 (ref 1.7–7)
NEUTROPHILS NFR BLD AUTO: 58.1 % (ref 42.7–76)
NRBC BLD AUTO-RTO: 0 /100 WBC (ref 0–0.2)
PHOSPHATE SERPL-MCNC: 3.3 MG/DL (ref 2.5–4.5)
PLATELET # BLD AUTO: 195 10*3/MM3 (ref 140–450)
PMV BLD AUTO: 10.3 FL (ref 6–12)
POTASSIUM SERPL-SCNC: 4.2 MMOL/L (ref 3.5–5.2)
QT INTERVAL: 387 MS
RBC # BLD AUTO: 3.31 10*6/MM3 (ref 3.77–5.28)
SODIUM SERPL-SCNC: 136 MMOL/L (ref 136–145)
WBC NRBC COR # BLD: 4.57 10*3/MM3 (ref 3.4–10.8)

## 2023-08-21 PROCEDURE — 82948 REAGENT STRIP/BLOOD GLUCOSE: CPT

## 2023-08-21 PROCEDURE — 99232 SBSQ HOSP IP/OBS MODERATE 35: CPT | Performed by: FAMILY MEDICINE

## 2023-08-21 PROCEDURE — 80048 BASIC METABOLIC PNL TOTAL CA: CPT | Performed by: HOSPITALIST

## 2023-08-21 PROCEDURE — 25010000002 ENOXAPARIN PER 10 MG: Performed by: HOSPITALIST

## 2023-08-21 PROCEDURE — 94799 UNLISTED PULMONARY SVC/PX: CPT

## 2023-08-21 PROCEDURE — 74019 RADEX ABDOMEN 2 VIEWS: CPT

## 2023-08-21 PROCEDURE — 97165 OT EVAL LOW COMPLEX 30 MIN: CPT

## 2023-08-21 PROCEDURE — 94664 DEMO&/EVAL PT USE INHALER: CPT

## 2023-08-21 PROCEDURE — 84100 ASSAY OF PHOSPHORUS: CPT | Performed by: HOSPITALIST

## 2023-08-21 PROCEDURE — 83735 ASSAY OF MAGNESIUM: CPT | Performed by: HOSPITALIST

## 2023-08-21 PROCEDURE — 99231 SBSQ HOSP IP/OBS SF/LOW 25: CPT | Performed by: NURSE PRACTITIONER

## 2023-08-21 PROCEDURE — 85025 COMPLETE CBC W/AUTO DIFF WBC: CPT | Performed by: HOSPITALIST

## 2023-08-21 PROCEDURE — 25010000002 METOCLOPRAMIDE PER 10 MG: Performed by: HOSPITALIST

## 2023-08-21 RX ORDER — MELOXICAM 7.5 MG/1
7.5 TABLET ORAL DAILY
COMMUNITY
Start: 2023-07-24

## 2023-08-21 RX ORDER — HYDRALAZINE HYDROCHLORIDE 10 MG/1
5 TABLET, FILM COATED ORAL ONCE
Status: DISCONTINUED | OUTPATIENT
Start: 2023-08-21 | End: 2023-08-21

## 2023-08-21 RX ORDER — ASPIRIN 81 MG/1
81 TABLET ORAL DAILY
Status: DISCONTINUED | OUTPATIENT
Start: 2023-08-21 | End: 2023-08-22 | Stop reason: HOSPADM

## 2023-08-21 RX ORDER — HYDROCORTISONE 25 MG/G
CREAM TOPICAL AS NEEDED
COMMUNITY
Start: 2023-07-17

## 2023-08-21 RX ORDER — METOPROLOL SUCCINATE 25 MG/1
12.5 TABLET, EXTENDED RELEASE ORAL DAILY
Status: DISCONTINUED | OUTPATIENT
Start: 2023-08-21 | End: 2023-08-22 | Stop reason: HOSPADM

## 2023-08-21 RX ORDER — PRAVASTATIN SODIUM 10 MG
10 TABLET ORAL DAILY
Status: DISCONTINUED | OUTPATIENT
Start: 2023-08-21 | End: 2023-08-22 | Stop reason: HOSPADM

## 2023-08-21 RX ORDER — LEVOTHYROXINE SODIUM 0.05 MG/1
50 TABLET ORAL
Status: DISCONTINUED | OUTPATIENT
Start: 2023-08-21 | End: 2023-08-22 | Stop reason: HOSPADM

## 2023-08-21 RX ORDER — TERAZOSIN 5 MG/1
5 CAPSULE ORAL NIGHTLY
Status: DISCONTINUED | OUTPATIENT
Start: 2023-08-21 | End: 2023-08-22 | Stop reason: HOSPADM

## 2023-08-21 RX ORDER — PRAVASTATIN SODIUM 10 MG
10 TABLET ORAL DAILY
COMMUNITY
Start: 2023-08-16

## 2023-08-21 RX ORDER — HYDROCORTISONE 25 MG/G
CREAM TOPICAL 2 TIMES DAILY PRN
Status: DISCONTINUED | OUTPATIENT
Start: 2023-08-21 | End: 2023-08-22 | Stop reason: HOSPADM

## 2023-08-21 RX ORDER — MONTELUKAST SODIUM 10 MG/1
10 TABLET ORAL NIGHTLY
Status: DISCONTINUED | OUTPATIENT
Start: 2023-08-21 | End: 2023-08-22 | Stop reason: HOSPADM

## 2023-08-21 RX ADMIN — METOCLOPRAMIDE HYDROCHLORIDE 5 MG: 5 INJECTION INTRAMUSCULAR; INTRAVENOUS at 13:14

## 2023-08-21 RX ADMIN — MONTELUKAST 10 MG: 10 TABLET, FILM COATED ORAL at 21:44

## 2023-08-21 RX ADMIN — BUDESONIDE AND FORMOTEROL FUMARATE DIHYDRATE 1 PUFF: 160; 4.5 AEROSOL RESPIRATORY (INHALATION) at 19:58

## 2023-08-21 RX ADMIN — PRAVASTATIN SODIUM 10 MG: 10 TABLET ORAL at 14:56

## 2023-08-21 RX ADMIN — ENOXAPARIN SODIUM 30 MG: 100 INJECTION SUBCUTANEOUS at 08:49

## 2023-08-21 RX ADMIN — ASPIRIN 81 MG: 81 TABLET, COATED ORAL at 14:56

## 2023-08-21 RX ADMIN — METOPROLOL SUCCINATE 12.5 MG: 25 TABLET, EXTENDED RELEASE ORAL at 14:56

## 2023-08-21 RX ADMIN — METOCLOPRAMIDE HYDROCHLORIDE 5 MG: 5 INJECTION INTRAMUSCULAR; INTRAVENOUS at 00:41

## 2023-08-21 RX ADMIN — METOCLOPRAMIDE HYDROCHLORIDE 5 MG: 5 INJECTION INTRAMUSCULAR; INTRAVENOUS at 07:51

## 2023-08-21 RX ADMIN — Medication 10 ML: at 19:52

## 2023-08-21 RX ADMIN — SODIUM CHLORIDE 100 ML/HR: 9 INJECTION, SOLUTION INTRAVENOUS at 00:41

## 2023-08-21 RX ADMIN — METOCLOPRAMIDE HYDROCHLORIDE 5 MG: 5 INJECTION INTRAMUSCULAR; INTRAVENOUS at 19:51

## 2023-08-21 RX ADMIN — BUDESONIDE AND FORMOTEROL FUMARATE DIHYDRATE 1 PUFF: 160; 4.5 AEROSOL RESPIRATORY (INHALATION) at 07:55

## 2023-08-21 RX ADMIN — LEVOTHYROXINE SODIUM 50 MCG: 50 TABLET ORAL at 14:56

## 2023-08-21 RX ADMIN — TERAZOSIN HYDROCHLORIDE 5 MG: 5 CAPSULE ORAL at 19:51

## 2023-08-21 RX ADMIN — SENNOSIDES AND DOCUSATE SODIUM 2 TABLET: 50; 8.6 TABLET ORAL at 08:49

## 2023-08-21 NOTE — CONSULTS
Pt has a good attitude, and she is a woman of ilene. She is happy because she will be discharged today.

## 2023-08-21 NOTE — PROGRESS NOTES
"PROGRESS NOTE     Patient Name:  Macie Miller  YOB: 1931  [id number]   LOS: 1 day   * No surgery found *  Patient Care Team:  Demi Carlson APRN as PCP - General (Family Medicine)      Reason for Consult/Chief complaint: nausea    Subjective     Interval History:  VSS, afebrile, tolerating clear liquid diet, +flatus and Bms, no abd pain       Review of Systems:      A complete review of systems was performed and all are negative except what is documented in the HPI.       Objective         Physical Exam:     Constitutional:  well nourished, no acute distress, appear stated age /68   Pulse 77   Temp 97.6 øF (36.4 øC)   Resp 18   Ht 160 cm (62.99\")   Wt 68 kg (149 lb 14.6 oz)   SpO2 98%   BMI 26.56 kg/mý    Eyes:  anicteric sclerae, moist conjunctivae, no lid lag, PERRLA  ENMT:  oropharynx clear, moist mucous membranes, good dentition  Neck:   full ROM, trachea midline, no thyromegaly  Cardiovascular: RRR, S1 and S2 present, no MRG, heart rate 77, no pedal edema  Respiratory: lungs CTA, respirations even and unlabored  GI:  Abdomen soft, nontender, nondistended, no HSM     Skin:  warm and dry, normal turgor, no rashes  Psychiatric:  alert and oriented x3, intact judgment and insight, cooperative    Results Review:      I reviewed the patient's new clinical results including CBC, BMP.  LABS:  WBC   Date Value Ref Range Status   08/21/2023 4.57 3.40 - 10.80 10*3/mm3 Final     RBC   Date Value Ref Range Status   08/21/2023 3.31 (L) 3.77 - 5.28 10*6/mm3 Final     Hemoglobin   Date Value Ref Range Status   08/21/2023 9.7 (L) 12.0 - 15.9 g/dL Final     Hematocrit   Date Value Ref Range Status   08/21/2023 30.0 (L) 34.0 - 46.6 % Final     MCV   Date Value Ref Range Status   08/21/2023 90.6 79.0 - 97.0 fL Final     MCH   Date Value Ref Range Status   08/21/2023 29.3 26.6 - 33.0 pg Final     MCHC   Date Value Ref Range Status   08/21/2023 32.3 31.5 - 35.7 g/dL Final     RDW   Date Value Ref " Range Status   08/21/2023 12.8 12.3 - 15.4 % Final     RDW-SD   Date Value Ref Range Status   08/21/2023 42.5 37.0 - 54.0 fl Final     MPV   Date Value Ref Range Status   08/21/2023 10.3 6.0 - 12.0 fL Final     Platelets   Date Value Ref Range Status   08/21/2023 195 140 - 450 10*3/mm3 Final     Neutrophil %   Date Value Ref Range Status   08/21/2023 58.1 42.7 - 76.0 % Final     Lymphocyte %   Date Value Ref Range Status   08/21/2023 27.4 19.6 - 45.3 % Final     Monocyte %   Date Value Ref Range Status   08/21/2023 8.3 5.0 - 12.0 % Final     Eosinophil %   Date Value Ref Range Status   08/21/2023 5.3 0.3 - 6.2 % Final     Basophil %   Date Value Ref Range Status   08/21/2023 0.7 0.0 - 1.5 % Final     Immature Grans %   Date Value Ref Range Status   08/21/2023 0.2 0.0 - 0.5 % Final     Neutrophils, Absolute   Date Value Ref Range Status   08/21/2023 2.66 1.70 - 7.00 10*3/mm3 Final     Lymphocytes, Absolute   Date Value Ref Range Status   08/21/2023 1.25 0.70 - 3.10 10*3/mm3 Final     Monocytes, Absolute   Date Value Ref Range Status   08/21/2023 0.38 0.10 - 0.90 10*3/mm3 Final     Eosinophils, Absolute   Date Value Ref Range Status   08/21/2023 0.24 0.00 - 0.40 10*3/mm3 Final     Basophils, Absolute   Date Value Ref Range Status   08/21/2023 0.03 0.00 - 0.20 10*3/mm3 Final     Immature Grans, Absolute   Date Value Ref Range Status   08/21/2023 0.01 0.00 - 0.05 10*3/mm3 Final     nRBC   Date Value Ref Range Status   08/21/2023 0.0 0.0 - 0.2 /100 WBC Final         Basic Metabolic Panel    Sodium Sodium   Date Value Ref Range Status   08/21/2023 136 136 - 145 mmol/L Final   08/20/2023 134 (L) 136 - 145 mmol/L Final      Potassium Potassium   Date Value Ref Range Status   08/21/2023 4.2 3.5 - 5.2 mmol/L Final   08/20/2023 4.3 3.5 - 5.2 mmol/L Final      Chloride Chloride   Date Value Ref Range Status   08/21/2023 103 98 - 107 mmol/L Final   08/20/2023 99 98 - 107 mmol/L Final      Bicarbonate No results found for:  PLASMABICARB   BUN BUN   Date Value Ref Range Status   08/21/2023 16 8 - 23 mg/dL Final   08/20/2023 17 8 - 23 mg/dL Final      Creatinine Creatinine   Date Value Ref Range Status   08/21/2023 1.09 (H) 0.57 - 1.00 mg/dL Final   08/20/2023 1.12 (H) 0.57 - 1.00 mg/dL Final      Calcium Calcium   Date Value Ref Range Status   08/21/2023 8.9 8.2 - 9.6 mg/dL Final   08/20/2023 9.4 8.2 - 9.6 mg/dL Final      Glucose      No components found for: GLUCOSE.*         IMAGING:  Imaging Results (Last 72 Hours)       ** No results found for the last 72 hours. **            Medications:    Current Facility-Administered Medications:     albuterol (PROVENTIL) nebulizer solution 0.083% 2.5 mg/3mL, 2.5 mg, Nebulization, Q6H PRN, Hernandez, Dimpi, DO    sennosides-docusate (PERICOLACE) 8.6-50 MG per tablet 2 tablet, 2 tablet, Oral, BID, 2 tablet at 08/21/23 0849 **AND** polyethylene glycol (MIRALAX) packet 17 g, 17 g, Oral, Daily PRN **AND** bisacodyl (DULCOLAX) EC tablet 5 mg, 5 mg, Oral, Daily PRN **AND** bisacodyl (DULCOLAX) suppository 10 mg, 10 mg, Rectal, Daily PRN, Hernandez, Dimpi, DO    budesonide-formoterol (SYMBICORT) 160-4.5 MCG/ACT inhaler 1 puff, 1 puff, Inhalation, BID - RT, Hernandez, Dimpi, DO, 1 puff at 08/21/23 0755    Calcium Replacement - Follow Nurse / BPA Driven Protocol, , Does not apply, PRN, Hernandez, Dimpi, DO    dextrose (D50W) (25 g/50 mL) IV injection 25 g, 25 g, Intravenous, Q15 Min PRN, Hernandez, Dimpi, DO    dextrose (GLUTOSE) oral gel 15 g, 15 g, Oral, Q15 Min PRN, Hernandez, Dimpi, DO    Enoxaparin Sodium (LOVENOX) syringe 30 mg, 30 mg, Subcutaneous, Daily, Hernandez, Dimpi, DO, 30 mg at 08/21/23 0849    glucagon (GLUCAGEN) injection 1 mg, 1 mg, Intramuscular, Q15 Min PRN, Hernandez, Dimpi, DO    hydrALAZINE (APRESOLINE) injection 10 mg, 10 mg, Intravenous, Q6H PRN, Hernandez, Dimpi, DO    HYDROmorphone (DILAUDID) injection 0.5 mg, 0.5 mg, Intravenous, Q2H PRN, Hernandez, Dimpi, DO    Insulin Lispro (humaLOG) injection 2-9 Units, 2-9  Units, Subcutaneous, 4x Daily AC & at Bedtime, Hernandez, Dimpi, DO    Magnesium Standard Dose Replacement - Follow Nurse / BPA Driven Protocol, , Does not apply, PRN, Hernanedz, Dimpi, DO    melatonin tablet 5 mg, 5 mg, Oral, Nightly PRN, Hernandez, Dimpi, DO    metoclopramide (REGLAN) injection 5 mg, 5 mg, Intravenous, Q6H, Hernandez, Dimpi, DO, 5 mg at 08/21/23 0751    ondansetron (ZOFRAN) tablet 4 mg, 4 mg, Oral, Q6H PRN **OR** ondansetron (ZOFRAN) injection 4 mg, 4 mg, Intravenous, Q6H PRN, Hernandez, Dimpi, DO    Phosphorus Replacement - Follow Nurse / BPA Driven Protocol, , Does not apply, PRN, Hernandez, Dimpi, DO    Potassium Replacement - Follow Nurse / BPA Driven Protocol, , Does not apply, PRN, Hernandez, Dimpi, DO    promethazine (PHENERGAN) tablet 12.5 mg, 12.5 mg, Oral, Q6H PRN **OR** promethazine (PHENERGAN) suppository 12.5 mg, 12.5 mg, Rectal, Q6H PRN, Hernandez, Dimpi, DO    sodium chloride 0.9 % flush 10 mL, 10 mL, Intravenous, Q12H, Hernandez, Dimpi, DO, 10 mL at 08/20/23 2022    sodium chloride 0.9 % flush 10 mL, 10 mL, Intravenous, PRN, Hernandez, Dimpi, DO    sodium chloride 0.9 % flush 10 mL, 10 mL, Intravenous, Q12H, Hernandez, Dimpi, DO, 10 mL at 08/20/23 2023    sodium chloride 0.9 % flush 10 mL, 10 mL, Intravenous, PRN, Hernandez, Dimpi, DO    sodium chloride 0.9 % infusion 40 mL, 40 mL, Intravenous, PRN, Hernandez, Dimpi, DO    sodium chloride 0.9 % infusion 40 mL, 40 mL, Intravenous, PRN, Hernandez, Dimpi, DO    sodium chloride 0.9 % infusion, 100 mL/hr, Intravenous, Continuous, Hernandez, Dimpi, DO, Last Rate: 100 mL/hr at 08/21/23 0041, 100 mL/hr at 08/21/23 0041    Assessment & Plan       Partial small bowel obstruction   -ok to advance diet as tolerated   -no surgical indications, general surgery signing off      ALEKSANDER Donaldson  08/21/23  08:59 EDT    Electronically signed by ALEKSANDER Donaldson, 08/21/23, 8:59 AM EDT.

## 2023-08-21 NOTE — SIGNIFICANT NOTE
08/21/23 1135   Coping/Psychosocial   Observed Emotional State calm;cooperative;happy   Verbalized Emotional State relief;hopefulness   Trust Relationship/Rapport empathic listening provided   Family/Support Persons daughter   Involvement in Care interacting with patient   Additional Documentation Spiritual Care (Group)   Spiritual Care   Use of Spiritual Resources prayer;spirituality for coping, indicated strong use of   Spiritual Care Source patient request (describe)   Spiritual Care Follow-Up follow-up, none required as presently assessed   Response to Spiritual Care receptive of support;engaged in conversation;thanks expressed   Spiritual Care Interventions supportive conversation provided;prayer support provided   Spiritual Care Visit Type other (see comments)  (Consult)   Spiritual Care Request prayer support   Receptivity to Spiritual Care visit welcomed

## 2023-08-21 NOTE — PROGRESS NOTES
[facility]   Hospitalist Progress Note  Date: 2023  Patient Name: Macie Miller  : 1931  MRN: [id number]  Date of admission: 2023      Subjective   Subjective     Chief Complaint: Abdominal pain nausea    Summary: Patient is 89-year-old female past medical history significant for hypertension hyperlipidemia hypothyroidism presented to [facility] abdominal pain nausea CT scan concerning for partial small bowel obstruction with history of multiple small bowel obstructions in the past transferred to [facility] under the care of the hospitalist surgery consulted.    Interval Followup: Patient seen and examined resting comfortably in bed passing gas having bowel movements no nausea diet has been advanced and appears to be tolerating okay repeating flat and upright of abdomen today.  Resuming home medications.  Likely home later today or more likely tomorrow to ensure she is able to tolerate regular meals.    Review of systems: All systems reviewed and negative except the following: Patient complains of generalized weakness and fatigue resolving abdominal pain nausea.    Objective   Objective     Vitals:   Temp:  [97.6 øF (36.4 øC)-98.1 øF (36.7 øC)] 97.8 øF (36.6 øC)  Heart Rate:  [67-86] 67  Resp:  [14-18] 18  BP: (129-174)/(52-68) 145/59    Physical Exam   Constitutional: Awake alert and oriented no acute distress  Respiratory: Clear breath sounds noted bilaterally  Cardiovascular: RRR  GI: Abdomen soft nontender bowel sounds are present  Neuro: No focal deficits speech is clear.    Result Review    Result Review:  I have personally reviewed the results from the time of this admission to 2023 14:04 EDT and agree with these findings:  [x]  Laboratory  []  Microbiology  []  Radiology  []  EKG/Telemetry   []  Cardiology/Vascular   []  Pathology  []  Old records  []  Other:    Assessment & Plan   Assessment / Plan   Assessment:    Partial small bowel obstruction appears  to be resolving  History of small bowel obstructions in the past  Mild renal insufficiency  Hypertension  Hyperlipidemia  Hypothyroidism    Plan:    Gentle IV fluids  Repeating flat and upright of abdomen  Repeating labs in a.m.  Resuming appropriate home medications once reconciled  General surgery consultation appreciated diet advance per the recommendation  Likely home 24 hours     Discussed plan with RN.    DVT prophylaxis:  Medical and mechanical DVT prophylaxis orders are present.    CODE STATUS:   Code Status (Patient has no pulse and is not breathing): CPR (Attempt to Resuscitate)  Medical Interventions (Patient has pulse or is breathing): Full Support        Electronically signed by RICHIE Dang, 08/21/23, 2:04 PM EDT.    Attending documentation:  I reviewed the above documentation and independently reviewed and rounded and evaluated the patient and discussed the care plan with EVAN Bustos PA-C, I agree with his findings and plan as documented, what I have added to the care plan and modified is as follows in my documentation and my medical decision making; 89-year-old female hospitalized on 8/20/2023 with small bowel obstruction, general surgery consulted, patient started having bowel movements and passing gas, flat and upright x-ray shows less gas distention of small bowel on 8/21/2023, diet introduced.  Interval follow-up: Seen and examined, passing gas, was tolerating clear liquids, KUB from this morning shows improvements in small bowel obstruction.  No fevers, chills, sweats.  Review of systems obtained, all systems reviewed and negative except for generalized fatigue.  Vitals reviewed, labs reviewed, blood sugars in the 100s, creatinine 1.09, potassium 4.2, sodium 136, white blood cell count 4000.  On physical exam well-appearing female, no acute distress, regular rate rhythm, clear to auscultation bilaterally, soft nontender abdomen, no lower extreme edema.  Assessment as above, plan is to  reintroduce diet, clear liquids, advance per tolerance, continue IV fluids overnight, a.m. labs, will continue with Lovenox, clinical course dictate further management, discussed with nurse at the bedside.  More than 51% of the time of this patient's encounter was performed by me, this included face-to-face time, planning and coordinating, medical decision making and critical thinking personally done by me.  -AVBMD    Electronically signed by Lizzy Turner MD, 08/21/23, 6:53 PM EDT.    Portions of this documentation were transcribed electronically from a voice recognition software.  I confirm all data accurately represents the service(s) I performed at today's visit.

## 2023-08-21 NOTE — PLAN OF CARE
Goal Outcome Evaluation:  Plan of Care Reviewed With: patient, daughter        Progress: no change  Outcome Evaluation: Patient does not present with any significant decline in function and does not require inpatient occupational therapy, therefore they will be discharged from services at this time. She is independent with all BADLs, functional transfers, and mobility using a RW. It is recommended she discharge home when medically able to do so. Patient is aware and agreeable with treatment plan at this time.      Anticipated Discharge Disposition (OT): home

## 2023-08-21 NOTE — SIGNIFICANT NOTE
08/21/23 1416   Plan   Plan YEN RN met with patient and daughter Harleen, at bedside.  Patient reports lives alone but has family that lives next door.  Reports having someone to clean home.  Family assist with meals and transportation.  Reports Teresa and Kat are POA's.  Reports no fianancial needs.  Patient plans to return home with no needs at this time.  Patient will continue with [facility] at discharge.

## 2023-08-21 NOTE — PLAN OF CARE
Goal Outcome Evaluation:      VSS. UOP. BM today. Tolerating regular diet. No c/o pain. Up ad dax to [facility].

## 2023-08-21 NOTE — THERAPY EVALUATION
Patient Name: Macie Miller  : 1931    MRN: [id number]                              Today's Date: 2023       Admit Date: 2023    Visit Dx:     ICD-10-CM ICD-9-CM   1. Partial small bowel obstruction  K56.600 560.9   2. Decreased activities of daily living (ADL)  Z78.9 V49.89     Patient Active Problem List   Diagnosis    Closed fracture of lower end of right radius with routine healing    Hip injury, left, initial encounter    Closed fracture of right wrist    Arthritis of carpometacarpal (CMC) joint of right thumb    Carpal tunnel syndrome of right wrist    Multiple closed fractures of metacarpal bone    Injury of right wrist    Postmenopausal    Closed nondisplaced fracture of shaft of fifth metacarpal bone of right hand    Osteoporosis with current pathological fracture    Closed nondisplaced fracture of fourth metacarpal bone of right hand    Closed nondisplaced fracture of proximal phalanx of right middle finger    S/P carpal tunnel release    Arthralgia of right hand    Joint swelling    Polyarthralgia    Encounter for medication monitoring    Chronic pain of right thumb    Right hand pain    Left hand pain    Partial small bowel obstruction     Past Medical History:   Diagnosis Date    Arthritis     Disease of thyroid gland     Hypertension      Past Surgical History:   Procedure Laterality Date    ABDOMINAL SURGERY      CARPAL TUNNEL RELEASE Right 2019    Dr. Johnathan Landaverde    HERNIA REPAIR      JOINT REPLACEMENT      KNEE SURGERY Bilateral     knee replacement    TONSILLECTOMY        General Information       Row Name 23 1116          OT Time and Intention    Document Type evaluation  -LF     Mode of Treatment individual therapy;occupational therapy  -LF       Row Name 23 1116          General Information    Patient Profile Reviewed yes  -LF     Prior Level of Function --  (I) with ADLs, ambulated with a RW as needed in the home and cane in the community, has a walk-in  shower with handheld shower head, standard commode, stands to groom, drives occassionally, and no home O2.  -     Existing Precautions/Restrictions no known precautions/restrictions  -     Barriers to Rehab none identified  -       Row Name 08/21/23 1116          Occupational Profile    Reason for Services/Referral (Occupational Profile) Patient is a 89 year old female admitted to [facility] for nausea on August 20th, 2023. Occupational therapy consulted due to recent decline in ADLs/functional transfers. No previous occupational therapy services for current condition.  -       Row Name 08/21/23 1116          Living Environment    People in Home alone  Family lives nearby and is able to assist if needed  -       Row Name 08/21/23 1116          Home Main Entrance    Number of Stairs, Main Entrance two  -       Row Name 08/21/23 1116          Cognition    Orientation Status (Cognition) oriented x 4  -       Row Name 08/21/23 1116          Safety Issues, Functional Mobility    Impairments Affecting Function (Mobility) other (see comments)  N/A  -               User Key  (r) = Recorded By, (t) = Taken By, (c) = Cosigned By      Initials Name Provider Type    LF Risa Del Cid OT Occupational Therapist                     Mobility/ADL's       Row Name 08/21/23 1118          Bed Mobility    Bed Mobility bed mobility (all) activities  -     All Activities, [place] (Bed Mobility) independent  -LF       Row Name 08/21/23 1118          Transfers    Transfers sit-stand transfer;stand-sit transfer  -       Row Name 08/21/23 1118          Sit-Stand Transfer    Sit-Stand [place] (Transfers) modified independence  -LF     Assistive Device (Sit-Stand Transfers) walker, front-wheeled  -LF       Row Name 08/21/23 1118          Stand-Sit Transfer    Stand-Sit [place] (Transfers) modified independence  -     Assistive Device (Stand-Sit Transfers) walker, front-wheeled  -LF       Row  Name 08/21/23 1118          Functional Mobility    Functional Mobility- Ind. Level conditional independence  -     Functional Mobility- Device walker, front-wheeled  -       Row Name 08/21/23 1118          Activities of Daily Living    BADL Assessment/Intervention bathing;upper body dressing;lower body dressing;grooming;feeding;toileting  -       Row Name 08/21/23 1118          Bathing Assessment/Intervention    [place] Level (Bathing) bathing skills;upper body;lower body;independent  -       Row Name 08/21/23 1118          Upper Body Dressing Assessment/Training    [place] Level (Upper Body Dressing) upper body dressing skills;independent  -       Row Name 08/21/23 1118          Lower Body Dressing Assessment/Training    [place] Level (Lower Body Dressing) lower body dressing skills;independent  -       Row Name 08/21/23 1118          Grooming Assessment/Training    [place] Level (Grooming) grooming skills;independent  -       Row Name 08/21/23 1118          Self-Feeding Assessment/Training    [place] Level (Feeding) feeding skills;independent  -       Row Name 08/21/23 1118          Toileting Assessment/Training    [place] Level (Toileting) toileting skills;independent  -               User Key  (r) = Recorded By, (t) = Taken By, (c) = Cosigned By      Initials Name Provider Type     Risa Del Cid OT Occupational Therapist                   Obj/Interventions       Row Name 08/21/23 1118          Sensory Assessment (Somatosensory)    Sensory Assessment (Somatosensory) UE sensation intact  -       Row Name 08/21/23 1118          Vision Assessment/Intervention    Visual Impairment/Limitations WFL  -       Row Name 08/21/23 1118          Range of Motion Comprehensive    General Range of Motion bilateral upper extremity ROM WFL  -       Row Name 08/21/23 1118          Strength Comprehensive (MMT)    Comment, General Manual Muscle Testing (MMT) Assessment 4+/5  BUEs  -LF       Row Name 08/21/23 1118          Motor Skills    Motor Skills coordination;functional endurance  -LF     Coordination WFL  -LF     Functional Endurance Fair+  -LF       [facility] Name 08/21/23 1118          Balance    Balance Assessment sitting dynamic balance;standing dynamic balance  -LF     Dynamic Sitting Balance independent  -LF     Position, Sitting Balance unsupported;sitting edge of bed  -LF     Dynamic Standing Balance modified independence  -LF     Position/Device Used, Standing Balance supported;walker, front-wheeled  -LF               User Key  (r) = Recorded By, (t) = Taken By, (c) = Cosigned By      Initials Name Provider Type     Risa Del Cid, JORGE Occupational Therapist                   Goals/Plan    No documentation.                  Clinical Impression       [facility] Name 08/21/23 1119          Pain Assessment    Additional Documentation Pain Scale: FACES Pre/Post-Treatment (Group)  -LF       Row Name 08/21/23 1119          Pain Scale: FACES Pre/Post-Treatment    Pain: FACES Scale, Pretreatment 0-->no hurt  -LF     Posttreatment Pain Rating 0-->no hurt  -LF       Row Name 08/21/23 1119          Plan of Care Review    Plan of Care Reviewed With patient;daughter  -     Progress no change  -     Outcome Evaluation Patient does not present with any significant decline in function and does not require inpatient occupational therapy, therefore they will be discharged from services at this time. She is independent with all BADLs, functional transfers, and mobility using a RW. It is recommended she discharge home when medically able to do so. Patient is aware and agreeable with treatment plan at this time.  -       Row Name 08/21/23 1119          Therapy Assessment/Plan (OT)    Patient/Family Therapy Goal Statement (OT) None reported.  -LF     Rehab Potential (OT) other (see comments)  N/A  -     Criteria for Skilled Therapeutic Interventions Met (OT) no problems identified which require  skilled intervention  -LF     Therapy Frequency (OT) evaluation only  -LF       Row Name 08/21/23 1119          Therapy Plan Review/Discharge Plan (OT)    Anticipated Discharge Disposition (OT) home  -       Row Name 08/21/23 1119          Vital Signs    O2 Delivery Pre Treatment room air  -LF     O2 Delivery Intra Treatment room air  -LF     O2 Delivery Post Treatment room air  -LF               User Key  (r) = Recorded By, (t) = Taken By, (c) = Cosigned By      Initials Name Provider Type     Risa Del Cid OT Occupational Therapist                   Outcome Measures       Row Name 08/21/23 1120          How much help from another is currently needed...    Putting on and taking off regular lower body clothing? 4  -LF     Bathing (including washing, rinsing, and drying) 4  -LF     Toileting (which includes using toilet bed pan or urinal) 4  -LF     Putting on and taking off regular upper body clothing 4  -LF     Taking care of personal grooming (such as brushing teeth) 4  -LF     Eating meals 4  -LF     AM-PAC 6 Clicks Score (OT) 24  -LF       Row Name 08/21/23 1120          Functional Assessment    Outcome Measure Options AM-PAC 6 Clicks Daily Activity (OT);Optimal Instrument  -LF       Row Name 08/21/23 1120          Optimal Instrument    Optimal Instrument Optimal - 3  -LF     Bending/Stooping 1  -LF     Standing 2  -LF     Reaching 1  -LF     From the list, choose the 3 activities you would most like to be able to do without any difficulty Bending/stooping;Standing;Reaching  -LF     Total Score Optimal - 3 4  -LF               User Key  (r) = Recorded By, (t) = Taken By, (c) = Cosigned By      Initials Name Provider Type     Risa Del Cid OT Occupational Therapist                    Occupational Therapy Education       Title: PT OT SLP Therapies (Done)       Topic: Occupational Therapy (Done)       Point: ADL training (Done)       Description:   Instruct learner(s) on proper safety adaptation and  remediation techniques during self care or transfers.   Instruct in proper use of assistive devices.                  Learning Progress Summary             Patient Acceptance, E,TB, VU by  at 8/21/2023 1120   Family Acceptance, E,TB, VU by  at 8/21/2023 1120                         Point: Precautions (Done)       Description:   Instruct learner(s) on prescribed precautions during self-care and functional transfers.                  Learning Progress Summary             Patient Acceptance, E,TB, VU by  at 8/21/2023 1120   Family Acceptance, E,TB, VU by  at 8/21/2023 1120                         Point: Body mechanics (Done)       Description:   Instruct learner(s) on proper positioning and spine alignment during self-care, functional mobility activities and/or exercises.                  Learning Progress Summary             Patient Acceptance, E,TB, VU by  at 8/21/2023 1120   Family Acceptance, E,TB, VU by  at 8/21/2023 1120                                         User Key       Initials Effective Dates Name Provider Type Discipline     06/16/21 -  Risa Del Cid OT Occupational Therapist OT                  OT Recommendation and Plan  Therapy Frequency (OT): evaluation only  Plan of Care Review  Plan of Care Reviewed With: patient, daughter  Progress: no change  Outcome Evaluation: Patient does not present with any significant decline in function and does not require inpatient occupational therapy, therefore they will be discharged from services at this time. She is independent with all BADLs, functional transfers, and mobility using a RW. It is recommended she discharge home when medically able to do so. Patient is aware and agreeable with treatment plan at this time.     Time Calculation:   Evaluation Complexity (OT)  Review Occupational Profile/Medical/Therapy History Complexity: brief/low complexity  Assessment, Occupational Performance/Identification of Deficit Complexity: 1-3 performance  deficits  Clinical Decision Making Complexity (OT): problem focused assessment/low complexity  Overall Complexity of Evaluation (OT): low complexity     Time Calculation- OT       Row Name 08/21/23 1121             Time Calculation- OT    OT Received On 08/21/23  -LF         Untimed Charges    OT Eval/Re-eval Minutes 35  -LF         Total Minutes    Untimed Charges Total Minutes 35  -LF       Total Minutes 35  -LF                User Key  (r) = Recorded By, (t) = Taken By, (c) = Cosigned By      Initials Name Provider Type    LF Risa Del Cid OT Occupational Therapist                  Therapy Charges for Today       Code Description Service Date Service Provider Modifiers Qty    [id number]  OT EVAL LOW COMPLEXITY 3 8/21/2023 Risa Del Cid OT GO 1                 Risa Del Cid OT  8/21/2023

## 2023-08-22 ENCOUNTER — READMISSION MANAGEMENT (OUTPATIENT)
Dept: CALL CENTER | Facility: HOSPITAL | Age: 88
End: 2023-08-22
Payer: MEDICARE

## 2023-08-22 VITALS
HEIGHT: 63 IN | HEART RATE: 74 BPM | DIASTOLIC BLOOD PRESSURE: 67 MMHG | SYSTOLIC BLOOD PRESSURE: 159 MMHG | TEMPERATURE: 98 F | RESPIRATION RATE: 18 BRPM | WEIGHT: 149.91 LBS | OXYGEN SATURATION: 96 % | BODY MASS INDEX: 26.56 KG/M2

## 2023-08-22 PROBLEM — E78.00 HIGH CHOLESTEROL: Status: ACTIVE | Noted: 2023-08-22

## 2023-08-22 PROBLEM — E11.9 TYPE 2 DIABETES MELLITUS: Status: ACTIVE | Noted: 2018-05-10

## 2023-08-22 PROBLEM — I10 HIGH BLOOD PRESSURE: Status: ACTIVE | Noted: 2023-08-22

## 2023-08-22 PROBLEM — E11.9 DIABETES: Status: ACTIVE | Noted: 2018-08-09

## 2023-08-22 LAB
ALBUMIN SERPL-MCNC: 3.4 G/DL (ref 3.5–5.2)
ALP SERPL-CCNC: 65 U/L (ref 39–117)
ALT SERPL W P-5'-P-CCNC: 8 U/L (ref 1–33)
ANION GAP SERPL CALCULATED.3IONS-SCNC: 8.2 MMOL/L (ref 5–15)
AST SERPL-CCNC: 13 U/L (ref 1–32)
BASOPHILS # BLD AUTO: 0.02 10*3/MM3 (ref 0–0.2)
BASOPHILS NFR BLD AUTO: 0.5 % (ref 0–1.5)
BILIRUB CONJ SERPL-MCNC: <0.2 MG/DL (ref 0–0.3)
BILIRUB INDIRECT SERPL-MCNC: ABNORMAL MG/DL
BILIRUB SERPL-MCNC: 0.2 MG/DL (ref 0–1.2)
BUN SERPL-MCNC: 12 MG/DL (ref 8–23)
BUN/CREAT SERPL: 13 (ref 7–25)
CALCIUM SPEC-SCNC: 8.9 MG/DL (ref 8.2–9.6)
CHLORIDE SERPL-SCNC: 106 MMOL/L (ref 98–107)
CO2 SERPL-SCNC: 23.8 MMOL/L (ref 22–29)
CREAT SERPL-MCNC: 0.92 MG/DL (ref 0.57–1)
DEPRECATED RDW RBC AUTO: 41.3 FL (ref 37–54)
EGFRCR SERPLBLD CKD-EPI 2021: 58.5 ML/MIN/1.73
EOSINOPHIL # BLD AUTO: 0.31 10*3/MM3 (ref 0–0.4)
EOSINOPHIL NFR BLD AUTO: 7.3 % (ref 0.3–6.2)
ERYTHROCYTE [DISTWIDTH] IN BLOOD BY AUTOMATED COUNT: 12.6 % (ref 12.3–15.4)
GLUCOSE BLDC GLUCOMTR-MCNC: 139 MG/DL (ref 70–99)
GLUCOSE SERPL-MCNC: 115 MG/DL (ref 65–99)
HCT VFR BLD AUTO: 29.3 % (ref 34–46.6)
HGB BLD-MCNC: 9.3 G/DL (ref 12–15.9)
IMM GRANULOCYTES # BLD AUTO: 0.01 10*3/MM3 (ref 0–0.05)
IMM GRANULOCYTES NFR BLD AUTO: 0.2 % (ref 0–0.5)
LYMPHOCYTES # BLD AUTO: 1.29 10*3/MM3 (ref 0.7–3.1)
LYMPHOCYTES NFR BLD AUTO: 30.5 % (ref 19.6–45.3)
MAGNESIUM SERPL-MCNC: 1.9 MG/DL (ref 1.7–2.3)
MCH RBC QN AUTO: 28.7 PG (ref 26.6–33)
MCHC RBC AUTO-ENTMCNC: 31.7 G/DL (ref 31.5–35.7)
MCV RBC AUTO: 90.4 FL (ref 79–97)
MONOCYTES # BLD AUTO: 0.47 10*3/MM3 (ref 0.1–0.9)
MONOCYTES NFR BLD AUTO: 11.1 % (ref 5–12)
NEUTROPHILS NFR BLD AUTO: 2.13 10*3/MM3 (ref 1.7–7)
NEUTROPHILS NFR BLD AUTO: 50.4 % (ref 42.7–76)
NRBC BLD AUTO-RTO: 0 /100 WBC (ref 0–0.2)
PHOSPHATE SERPL-MCNC: 2.7 MG/DL (ref 2.5–4.5)
PLATELET # BLD AUTO: 197 10*3/MM3 (ref 140–450)
PMV BLD AUTO: 10.2 FL (ref 6–12)
POTASSIUM SERPL-SCNC: 4.2 MMOL/L (ref 3.5–5.2)
PROT SERPL-MCNC: 5.3 G/DL (ref 6–8.5)
RBC # BLD AUTO: 3.24 10*6/MM3 (ref 3.77–5.28)
SODIUM SERPL-SCNC: 138 MMOL/L (ref 136–145)
WBC NRBC COR # BLD: 4.23 10*3/MM3 (ref 3.4–10.8)

## 2023-08-22 PROCEDURE — 94664 DEMO&/EVAL PT USE INHALER: CPT

## 2023-08-22 PROCEDURE — 82948 REAGENT STRIP/BLOOD GLUCOSE: CPT

## 2023-08-22 PROCEDURE — 84100 ASSAY OF PHOSPHORUS: CPT | Performed by: FAMILY MEDICINE

## 2023-08-22 PROCEDURE — 25010000002 METOCLOPRAMIDE PER 10 MG: Performed by: HOSPITALIST

## 2023-08-22 PROCEDURE — 83735 ASSAY OF MAGNESIUM: CPT | Performed by: FAMILY MEDICINE

## 2023-08-22 PROCEDURE — 99239 HOSP IP/OBS DSCHRG MGMT >30: CPT | Performed by: FAMILY MEDICINE

## 2023-08-22 PROCEDURE — 80048 BASIC METABOLIC PNL TOTAL CA: CPT | Performed by: FAMILY MEDICINE

## 2023-08-22 PROCEDURE — 80076 HEPATIC FUNCTION PANEL: CPT | Performed by: FAMILY MEDICINE

## 2023-08-22 PROCEDURE — 85025 COMPLETE CBC W/AUTO DIFF WBC: CPT | Performed by: FAMILY MEDICINE

## 2023-08-22 PROCEDURE — 94799 UNLISTED PULMONARY SVC/PX: CPT

## 2023-08-22 PROCEDURE — 97161 PT EVAL LOW COMPLEX 20 MIN: CPT

## 2023-08-22 RX ADMIN — ASPIRIN 81 MG: 81 TABLET, COATED ORAL at 08:12

## 2023-08-22 RX ADMIN — BUDESONIDE AND FORMOTEROL FUMARATE DIHYDRATE 1 PUFF: 160; 4.5 AEROSOL RESPIRATORY (INHALATION) at 06:45

## 2023-08-22 RX ADMIN — LEVOTHYROXINE SODIUM 50 MCG: 50 TABLET ORAL at 06:25

## 2023-08-22 RX ADMIN — METOPROLOL SUCCINATE 12.5 MG: 25 TABLET, EXTENDED RELEASE ORAL at 08:12

## 2023-08-22 RX ADMIN — METOCLOPRAMIDE HYDROCHLORIDE 5 MG: 5 INJECTION INTRAMUSCULAR; INTRAVENOUS at 01:22

## 2023-08-22 RX ADMIN — METOCLOPRAMIDE HYDROCHLORIDE 5 MG: 5 INJECTION INTRAMUSCULAR; INTRAVENOUS at 08:12

## 2023-08-22 NOTE — THERAPY EVALUATION
"No chief complaint on file.      Initial /70 (BP Location: Right arm, Cuff Size: Adult Regular)  Pulse 72  Temp 97.8  F (36.6  C) (Tympanic)  Ht 4' 10\" (1.473 m)  Wt 120 lb (54.4 kg)  LMP 06/15/1985  BMI 25.08 kg/m2 Estimated body mass index is 25.08 kg/(m^2) as calculated from the following:    Height as of this encounter: 4' 10\" (1.473 m).    Weight as of this encounter: 120 lb (54.4 kg).  Medication Reconciliation: complete    Health Maintenance that is potentially due pending provider review:  PHQ9, GAD7 and ACT    Patient given PHQ9 and ACT.    Cailin Pham MA         " Acute Care - Physical Therapy Initial Evaluation   [place]     Patient Name: Macie Miller  : 1931  MRN: [id number]  Today's Date: 2023      Visit Dx:     ICD-10-CM ICD-9-CM   1. Partial small bowel obstruction  K56.600 560.9   2. Decreased activities of daily living (ADL)  Z78.9 V49.89   3. Difficulty walking  R26.2 719.7     Patient Active Problem List   Diagnosis    Closed fracture of lower end of right radius with routine healing    Hip injury, left, initial encounter    Closed fracture of right wrist    Arthritis of carpometacarpal (CMC) joint of right thumb    Carpal tunnel syndrome of right wrist    Multiple closed fractures of metacarpal bone    Injury of right wrist    Postmenopausal    Closed nondisplaced fracture of shaft of fifth metacarpal bone of right hand    Osteoporosis with current pathological fracture    Closed nondisplaced fracture of fourth metacarpal bone of right hand    Closed nondisplaced fracture of proximal phalanx of right middle finger    S/P carpal tunnel release    Arthralgia of right hand    Joint swelling    Polyarthralgia    Encounter for medication monitoring    Chronic pain of right thumb    Right hand pain    Left hand pain    Partial small bowel obstruction    High blood pressure    High cholesterol    Diabetes    Type 2 diabetes mellitus     Past Medical History:   Diagnosis Date    Arthritis     Disease of thyroid gland     Hypertension      Past Surgical History:   Procedure Laterality Date    ABDOMINAL SURGERY      CARPAL TUNNEL RELEASE Right 2019    Dr. Johnathan Landaverde    HERNIA REPAIR      JOINT REPLACEMENT      KNEE SURGERY Bilateral     knee replacement    TONSILLECTOMY       PT Assessment (last 12 hours)       PT Evaluation and Treatment       Row Name 23 1000          Physical Therapy Time and Intention    Subjective Information no complaints  -DP     Document Type evaluation  -DP     Mode of Treatment individual therapy;physical therapy   -DP     Patient Effort good  -DP       Row Name 08/22/23 1000          General Information    Patient Profile Reviewed yes  -DP     Patient Observations alert;cooperative;agree to therapy  -DP     Prior Level of Function independent:;gait;transfer;bed mobility;ADL's  -DP     Equipment Currently Used at Home none  -DP     Existing Precautions/Restrictions no known precautions/restrictions  -DP     Barriers to Rehab none identified  -DP       Row Name 08/22/23 1000          Living Environment    Current Living Arrangements home  -DP     People in Home alone  -DP     Primary Care Provided by self  -DP       Row Name 08/22/23 1000          Home Main Entrance    Number of Stairs, Main Entrance two  -DP       Row Name 08/22/23 1000          Cognition    Orientation Status (Cognition) oriented x 4  -DP       Row Name 08/22/23 1000          Range of Motion (ROM)    Range of Motion bilateral lower extremities;ROM is WFL  -DP       Row Name 08/22/23 1000          Strength (Manual Muscle Testing)    Strength (Manual Muscle Testing) bilateral lower extremities;strength is WFL  -DP       Row Name 08/22/23 1000          Bed Mobility    Bed Mobility supine-sit-supine  -DP     Supine-Sit-Supine [place] (Bed Mobility) independent  -DP       Row Name 08/22/23 1000          Transfers    Transfers sit-stand transfer  -DP       Row Name 08/22/23 1000          Sit-Stand Transfer    Sit-Stand [place] (Transfers) independent  -DP       Row Name 08/22/23 1000          Gait/Stairs (Locomotion)    Gait/Stairs Locomotion gait/ambulation independence  -DP     [place] Level (Gait) independent  -DP     Assistive Device (Gait) --  no AD  -DP     Distance in Feet (Gait) 40  -DP     Comment, (Gait/Stairs) Patient is able to ambulate  -DP       Row Name 08/22/23 1000          Balance    Dynamic Standing Balance independent  -DP       Row Name 08/22/23 1000          Plan of Care Review    Plan of Care Reviewed With patient  -DP      Outcome Evaluation Patient is independent with all transfers and is able to ambulate ad dax. without an assistive device.  She does not need inpatient PT services.  -DP       Row Name 08/22/23 1000          Therapy Assessment/Plan (PT)    Criteria for Skilled Interventions Met (PT) no problems identified which require skilled intervention  -DP     Therapy Frequency (PT) evaluation only  -DP       Row Name 08/22/23 1000          PT Evaluation Complexity    History, PT Evaluation Complexity no personal factors and/or comorbidities  -DP     Examination of Body Systems (PT Eval Complexity) total of 4 or more elements  -DP     Clinical Presentation (PT Evaluation Complexity) stable  -DP     Clinical Decision Making (PT Evaluation Complexity) low complexity  -DP     Overall Complexity (PT Evaluation Complexity) low complexity  -DP               User Key  (r) = Recorded By, (t) = Taken By, (c) = Cosigned By      Initials Name Provider Type    Kelly Orellana, PT Physical Therapist                  PHYSICAL THERAPY GOALS/ PLAN    LTG 1:  Today:  Complete PT evaluation.  Treatment:  None  Time Frame/Durataion: 1 day  Outcome: Goal met      PT Recommendation and Plan  Anticipated Discharge Disposition (PT): home  Therapy Frequency (PT): evaluation only  Plan of Care Reviewed With: patient  Outcome Evaluation: Patient is independent with all transfers and is able to ambulate ad dax. without an assistive device.  She does not need inpatient PT services.   Outcome Measures       Row Name 08/22/23 1000             How much help from another person do you currently need...    Turning from your back to your side while in flat bed without using bedrails? 4  -DP      Moving from lying on back to sitting on the side of a flat bed without bedrails? 4  -DP      Moving to and from a bed to a chair (including a wheelchair)? 4  -DP      Standing up from a chair using your arms (e.g., wheelchair, bedside chair)? 4  -DP      Climbing 3-5  steps with a railing? 4  -DP      To walk in hospital room? 4  -DP      AM-PAC 6 Clicks Score (PT) 24  -DP         Functional Assessment    Outcome Measure Options AM-PAC 6 Clicks Basic Mobility (PT)  -DP                User Key  (r) = Recorded By, (t) = Taken By, (c) = Cosigned By      Initials Name Provider Type    Kelly Orellana, PT Physical Therapist                     Time Calculation:    PT Charges       Row Name 08/22/23 1012             Time Calculation    PT Received On 08/22/23  -DP         Untimed Charges    PT Eval/Re-eval Minutes 30  -DP         Total Minutes    Untimed Charges Total Minutes 30  -DP       Total Minutes 30  -DP                User Key  (r) = Recorded By, (t) = Taken By, (c) = Cosigned By      Initials Name Provider Type    Kelly Orellana, PT Physical Therapist                      PT G-Codes  Outcome Measure Options: AM-PAC 6 Clicks Basic Mobility (PT)  AM-PAC 6 Clicks Score (PT): 24  AM-PAC 6 Clicks Score (OT): 24    Kelly Hernandez PT  8/22/2023

## 2023-08-22 NOTE — DISCHARGE SUMMARY
[facility]         HOSPITALIST  DISCHARGE SUMMARY    Patient Name: Macie Miller  : 1931  MRN: [id number]    Date of Admission: 2023  Date of Discharge:  2023    Primary Care Physician: Demi Carlson APRN    Consults       Date and Time Order Name Status Description    2023  1:37 PM Inpatient General Surgery Consult Completed             Active and Resolved Hospital Problems:    Partial small bowel obstruction appears to be resolving  History of small bowel obstructions in the past  Mild renal insufficiency  Hypertension  Hyperlipidemia  Hypothyroidism    Active Hospital Problems    Diagnosis POA    Partial small bowel obstruction [K56.600] Yes      Resolved Hospital Problems   No resolved problems to display.       Hospital Course     Hospital Course:    89-year-old female hospitalized on 2023 with small bowel obstruction, general surgery consulted, patient started having bowel movements and passing gas, flat and upright x-ray shows less gas distention of small bowel on 2023, diet introduced.  Diet advanced, had subsequent bowel movements.  Discharged in hemodynamically stable condition on 2023 to follow-up with PCP within 1 week.          Day of Discharge     Vital Signs:  Temp:  [97.6 øF (36.4 øC)-98.3 øF (36.8 øC)] 98 øF (36.7 øC)  Heart Rate:  [71-83] 74  Resp:  [16-20] 18  BP: (140-179)/(54-67) 159/67  Review of systems:  All systems reviewed and negative except for generalized fatigue    Physical exam:   Constitutional: Awake, alert, no acute distress   Eyes: Pupils equal, sclerae anicteric, no conjunctival injection   HENT: NCAT, mucous membranes moist   Neck: Supple, no thyromegaly, no lymphadenopathy, trachea midline   Respiratory: Clear to auscultation bilaterally, nonlabored respirations    Cardiovascular: RRR, no murmurs, rubs, or gallops, palpable pedal pulses bilaterally   Gastrointestinal: Positive bowel sounds, soft, nontender,  nondistended   Musculoskeletal: No bilateral ankle edema, no clubbing or cyanosis to extremities   Psychiatric: Appropriate affect, cooperative   Neurologic: Oriented x 3, strength symmetric in all extremities, Cranial Nerves grossly intact to confrontation, speech clear   Skin: No rashes visible on exposed skin      Discharge Details        Discharge Medications        Continue These Medications        Instructions Start Date   albuterol sulfate  (90 Base) MCG/ACT inhaler  Commonly known as: PROVENTIL HFA;VENTOLIN HFA;PROAIR HFA   Inhale 2 puffs Every 4 (Four) Hours As Needed.      aspirin 81 MG EC tablet   81 mg, Oral, Daily      cetirizine 10 MG tablet  Commonly known as: zyrTEC   10 mg, Oral, Daily      doxazosin 8 MG tablet  Commonly known as: CARDURA   Take 0.5 tablets by mouth Every Night.      Hydrocortisone (Perianal) 2.5 % rectal cream  Commonly known as: ANUSOL-HC   As Needed      levothyroxine 50 MCG tablet  Commonly known as: SYNTHROID, LEVOTHROID   50 mcg, Oral, Daily      meloxicam 7.5 MG tablet  Commonly known as: MOBIC   7.5 mg, Oral, Daily      metFORMIN  MG 24 hr tablet  Commonly known as: GLUCOPHAGE-XR   Take 1 tablet by mouth Daily.      metoprolol succinate XL 25 MG 24 hr tablet  Commonly known as: TOPROL-XL   25 mg, Oral, Daily      montelukast 10 MG tablet  Commonly known as: SINGULAIR   Take 1 tablet by mouth Every Night.      pravastatin 10 MG tablet  Commonly known as: PRAVACHOL   10 mg, Oral, Daily             Stop These Medications      hydroCHLOROthiazide 25 MG tablet  Commonly known as: HYDRODIURIL              Allergies   Allergen Reactions    Ciprofloxacin Rash    Penicillins Rash       Discharge Disposition:  Home or Self Care    Diet:  Hospital:No active diet order      Discharge Activity: as tolerates      CODE STATUS:  Code Status and Medical Interventions:   Ordered at: 08/21/23 0649     Code Status (Patient has no pulse and is not breathing):    CPR (Attempt to  Resuscitate)     Medical Interventions (Patient has pulse or is breathing):    Full Support         Future Appointments   Date Time Provider Department Center   8/29/2023  1:45 PM Jaiden Seo PA-C [facility]       Additional Instructions for the Follow-ups that You Need to Schedule       Discharge Follow-up with PCP   As directed       Currently Documented PCP:    Demi Carlson APRN    PCP Phone Number:    [phone]     Follow Up Details: 3 to 7 days                Pertinent  and/or Most Recent Results     PROCEDURES:   XR Abdomen Flat & Upright    Result Date: 8/21/2023  PROCEDURE: XR ABDOMEN FLAT AND UPRIGHT  COMPARISON: Other, CT, CT ABDOMEN PELVIS WO CONTRAST, 8/20/2023, 10:43.  Other, CR, XR ABDOMEN 2+ VIEWS W CHEST 1 VW, 8/20/2023, 9:34.  INDICATIONS: FOLLOW-UP SMALL BOWEL OBSTRUCTION  FINDINGS:  There is evidence for vertebral augmentation in the lower thoracic area.  There is a scattered amount a gas present within bowel in a non specific fashion.  There is less gaseous distention of small bowel.  There are pelvic calcifications which could reflect phleboliths.  There is density in the left lateral costophrenic sulcus in what is visualized the chest that could reflect underlying pleural effusion versus cardiophrenic fat.        1. Less gaseous distention of small bowel as compared to prior day's KUB. 2. Evidence for vertebral augmentation 3. Probable small left pleural effusion versus cardiophrenic fat.    .  The need for follow-up should be based on clinical findings.     KAVYA GOLDMAN MD       Electronically Signed and Approved By: KAVYA GOLDMAN MD on 8/21/2023 at 15:29                LAB RESULTS:      Lab 08/22/23  0516 08/21/23  0429 08/20/23  1616   WBC 4.23 4.57 6.87   HEMOGLOBIN 9.3* 9.7* 10.4*   HEMATOCRIT 29.3* 30.0* 31.5*   PLATELETS 197 195 204   NEUTROS ABS 2.13 2.66 5.17   IMMATURE GRANS (ABS) 0.01 0.01 0.01   LYMPHS ABS 1.29 1.25 1.10   MONOS ABS 0.47 0.38 0.42   EOS ABS 0.31  0.24 0.16   MCV 90.4 90.6 89.0         Lab 08/22/23  0516 08/21/23  0429 08/20/23  1616   SODIUM 138 136 134*   POTASSIUM 4.2 4.2 4.3   CHLORIDE 106 103 99   CO2 23.8 26.1 25.0   ANION GAP 8.2 6.9 10.0   BUN 12 16 17   CREATININE 0.92 1.09* 1.12*   EGFR 58.5* 47.8* 46.2*   GLUCOSE 115* 103* 143*   CALCIUM 8.9 8.9 9.4   MAGNESIUM 1.9 2.1 2.1   PHOSPHORUS 2.7 3.3 3.1         Lab 08/22/23  0516   TOTAL PROTEIN 5.3*   ALBUMIN 3.4*   ALT (SGPT) 8   AST (SGOT) 13   BILIRUBIN 0.2   BILIRUBIN DIRECT <0.2   ALK PHOS 65                     Brief Urine Lab Results       None          Microbiology Results (last 10 days)       ** No results found for the last 240 hours. **            XR Abdomen Flat & Upright    Result Date: 8/21/2023  Impression:   1. Less gaseous distention of small bowel as compared to prior day's KUB. 2. Evidence for vertebral augmentation 3. Probable small left pleural effusion versus cardiophrenic fat.    .  The need for follow-up should be based on clinical findings.     KAVYA GOLDMAN MD       Electronically Signed and Approved By: KAVYA GOLDMAN MD on 8/21/2023 at 15:29                      Results for orders placed in visit on 07/12/21    Adult Transthoracic Echo Complete w/ Color, Spectral and Contrast if necessary per protocol    Interpretation Summary  Fibrocalcific mitral aortic valves.  Normal left ventricular systolic function  Mild mitral regurgitation  Mild tricuspid regurgitation  Mild pulmonary hypertension  Grade 1 diastolic dysfunction.      Labs Pending at Discharge: None        Time spent on Discharge including face to face service:  32 minutes    Electronically signed by Lizzy Turner MD, 08/22/23, 12:42 PM EDT.    Portions of this documentation were transcribed electronically from a voice recognition software.  I confirm all data accurately represents the service(s) I performed at today's visit.

## 2023-08-22 NOTE — PLAN OF CARE
Goal Outcome Evaluation:         Education complete. Patient discharging.   Aide King RN

## 2023-08-22 NOTE — PLAN OF CARE
Goal Outcome Evaluation:  Plan of Care Reviewed With: patient           Outcome Evaluation: Patient is independent with all transfers and is able to ambulate ad dax. without an assistive device.  She does not need inpatient PT services.      Anticipated Discharge Disposition (PT): home

## 2023-08-23 NOTE — OUTREACH NOTE
Prep Survey      Flowsheet Row Responses   [facility] facility patient discharged from? [facility]   Is LACE score < 7 ? Yes   Eligibility Not Eligible   What are the reasons patient is not eligible? Other  [Readmission Risk Score low]   Does the patient have one of the following disease processes/diagnoses(primary or secondary)? Other   Prep survey completed? Yes            Radha FARFAN - Registered Nurse

## 2025-04-30 NOTE — PROGRESS NOTES
Chief Complaint        Constipation    Patient or patient representative verbalized consent for the use of Ambient Listening during the visit with  ALEKSANDER Tinoco for chart documentation. 5/5/2025  13:04 EDT    History of Present Illness      Macie Miller is a 89 y.o. female who presents to [facility] as a new patient       History of Present Illness  The patient is a 89-year-old female who presents for evaluation of bowel obstruction, nausea, and constipation.    She has a documented history of recurrent bowel obstructions, with the most recent episode occurring in April 2024, necessitating a week-long hospital stay. She was previously advised by Dr. Maloney to incorporate MiraLAX into her daily regimen, which she has been adhering to every morning. This has resulted in regular bowel movements over the past two weeks. However, the presence of hemorrhoids significantly impedes her ability to defecate. MiraLAX has been part of her routine for several years, initially on an as-needed basis, but more recently on a daily basis. Despite this, she continues to experience constipation. She has not undergone a colonoscopy in several years.    She also reports frequent episodes of nausea, which are particularly pronounced after meals. She experienced mild nausea last night before retiring to bed. Morning nausea is not typically an issue. She has been managing her symptoms with famotidine, taken as needed, and Zofran. She is not currently taking Prilosec, although it has been prescribed for her reflux. Her past medical history includes a hiatal hernia repair.    PAST SURGICAL HISTORY: Hiatal hernia repair    Most recent labs- 2/5/2025    CT Abdomen/Pelvis- 2/6/2025  Diffuse fluid noted throughout the nondilated small bowel and colon with diffuse mucosal enhancement of the small bowel consistent with acute generalized entercolitis/ diarrheal illness.   No evidence of bowel  "obstruction.  Noninflamed sigmoid colonic diverticula.     Colonoscopy reported 2010 during office visit with Vince Arellano in 2020. Patient declined to repeat at that time.       Results       Result Review :   The following data was reviewed by: ALEKSANDER Tinoco on 05/05/2025           Iron Profile No results found for: \"IRON\", \"TIBC\", \"LABIRON\", \"TRANSFERRIN\"  Ferritin No results found for: \"FERRITIN\"         Results          Past Medical History       Past Medical History:   Diagnosis Date    Arthritis     Diabetes mellitus ?    Under control    Disease of thyroid gland     Diverticulitis of colon ?    Don’t remember    Hernia 7/2024    Still have    Hypertension        Past Surgical History:   Procedure Laterality Date    ABDOMINAL SURGERY      CARPAL TUNNEL RELEASE Right 09/13/2019    Dr. Johnathan Landaverde    CHOLECYSTECTOMY      COLONOSCOPY      HERNIA REPAIR      HYSTERECTOMY      JOINT REPLACEMENT      KNEE SURGERY Bilateral     knee replacement    TONSILLECTOMY      UPPER GASTROINTESTINAL ENDOSCOPY           Current Outpatient Medications:     albuterol sulfate  (90 Base) MCG/ACT inhaler, Inhale 2 puffs Every 4 (Four) Hours As Needed., Disp: , Rfl:     aspirin 81 MG EC tablet, Take 1 tablet by mouth Daily., Disp: , Rfl:     cetirizine (zyrTEC) 10 MG tablet, Take 1 tablet by mouth Daily., Disp: , Rfl:     doxazosin (CARDURA) 8 MG tablet, Take 0.5 tablets by mouth Every Night., Disp: , Rfl:     furosemide (LASIX) 20 MG tablet, Take 1 tablet by mouth Daily., Disp: , Rfl:     Hydrocortisone, Perianal, (ANUSOL-HC) 2.5 % rectal cream, As Needed., Disp: , Rfl:     levothyroxine (SYNTHROID, LEVOTHROID) 50 MCG tablet, Take 1 tablet by mouth Daily., Disp: , Rfl:     losartan (COZAAR) 50 MG tablet, , Disp: , Rfl:     meloxicam (MOBIC) 7.5 MG tablet, Take 1 tablet by mouth Daily., Disp: , Rfl:     metFORMIN ER (GLUCOPHAGE-XR) 500 MG 24 hr tablet, Take 1 tablet by mouth Daily., Disp: , Rfl:     metoprolol succinate " "XL (TOPROL-XL) 25 MG 24 hr tablet, Take 1 tablet by mouth Daily., Disp: , Rfl:     montelukast (SINGULAIR) 10 MG tablet, Take 1 tablet by mouth Every Night., Disp: , Rfl:     omeprazole (priLOSEC) 20 MG capsule, Take 1 capsule by mouth Daily., Disp: 30 capsule, Rfl: 3    ondansetron ODT (ZOFRAN-ODT) 4 MG disintegrating tablet, Place 1 tablet on the tongue Every 8 (Eight) Hours As Needed for Nausea or Vomiting., Disp: 20 tablet, Rfl: 0    oxybutynin (DITROPAN) 5 MG tablet, , Disp: , Rfl:     pravastatin (PRAVACHOL) 10 MG tablet, Take 1 tablet by mouth Daily., Disp: , Rfl:     True Metrix Blood Glucose Test test strip, USE TO CHECK BLOOD SUGAR DAILY, Disp: , Rfl:     linaclotide (Linzess) 72 MCG capsule capsule, Take 1 capsule by mouth Every Morning Before Breakfast., Disp: 12 capsule, Rfl: 0     Allergies   Allergen Reactions    Ciprofloxacin Rash    Penicillins Rash       History reviewed. No pertinent family history.     Social History     Social History Narrative    Not on file       Objective       Objective     Vital Signs:   /58   Pulse 65   Ht 157.5 cm (62\")   Wt 68.7 kg (151 lb 6.4 oz)   BMI 27.69 kg/m²     Body mass index is 27.69 kg/m².    Physical Exam  Constitutional:       Appearance: Normal appearance.   Pulmonary:      Effort: Pulmonary effort is normal.   Neurological:      General: No focal deficit present.      Mental Status: She is alert and oriented to person, place, and time.   Psychiatric:         Mood and Affect: Mood normal.         Behavior: Behavior normal.         Physical Exam  General: Alert and oriented, no acute distress.  Abdomen: No tenderness, bowel sounds present.               Assessment & Plan          Assessment and Plan    Diagnoses and all orders for this visit:    1. Altered bowel habits (Primary)    2. Constipation, unspecified constipation type    3. History of small bowel obstruction    Other orders  -     omeprazole (priLOSEC) 20 MG capsule; Take 1 capsule by " mouth Daily.  Dispense: 30 capsule; Refill: 3  -     ondansetron ODT (ZOFRAN-ODT) 4 MG disintegrating tablet; Place 1 tablet on the tongue Every 8 (Eight) Hours As Needed for Nausea or Vomiting.  Dispense: 20 tablet; Refill: 0          Assessment & Plan  1. Bowel obstruction:  - Continue taking MiraLAX daily.  - Provide samples of Linzess to use if MiraLAX becomes ineffective.  - Linzess to be taken once daily in the morning if needed.  - No endoscopic procedures recommended due to increased risk of perforation, bleeding, sedation, and infection.    2. Nausea:  - Prescription for Prilosec to be taken each morning.  - Refills for Zofran provided for use as needed.  6-month follow-up.    I have recommended that the patient undergo further evaluation with a colonoscopy.  I have discussed this procedure in detail with the patient.  I have discussed the risks, benefits and alternatives.  I have discussed the risk of anesthesia, bleeding and perforation.  Patient understands these risks, benefits and alternatives and wishes to defer due to age and comorbidities.            Follow Up       Follow Up   Return in about 6 months (around 11/5/2025).  Patient was given instructions and counseling regarding her condition or for health maintenance advice. Please see specific information pulled into the AVS if appropriate.

## 2025-05-05 ENCOUNTER — OFFICE VISIT (OUTPATIENT)
Dept: GASTROENTEROLOGY | Facility: CLINIC | Age: OVER 89
End: 2025-05-05
Payer: MEDICARE

## 2025-05-05 VITALS
HEART RATE: 65 BPM | WEIGHT: 151.4 LBS | BODY MASS INDEX: 27.86 KG/M2 | HEIGHT: 62 IN | SYSTOLIC BLOOD PRESSURE: 138 MMHG | DIASTOLIC BLOOD PRESSURE: 58 MMHG

## 2025-05-05 DIAGNOSIS — K59.04 CHRONIC IDIOPATHIC CONSTIPATION: Primary | ICD-10-CM

## 2025-05-05 DIAGNOSIS — R19.4 ALTERED BOWEL HABITS: Primary | ICD-10-CM

## 2025-05-05 DIAGNOSIS — Z87.19 HISTORY OF SMALL BOWEL OBSTRUCTION: ICD-10-CM

## 2025-05-05 DIAGNOSIS — K59.00 CONSTIPATION, UNSPECIFIED CONSTIPATION TYPE: ICD-10-CM

## 2025-05-05 RX ORDER — ONDANSETRON 4 MG/1
TABLET, ORALLY DISINTEGRATING ORAL
COMMUNITY
Start: 2025-02-06 | End: 2025-05-05 | Stop reason: SDUPTHER

## 2025-05-05 RX ORDER — CALCIUM CITRATE/VITAMIN D3 200MG-6.25
TABLET ORAL
COMMUNITY
Start: 2025-02-24

## 2025-05-05 RX ORDER — OXYBUTYNIN CHLORIDE 5 MG/1
TABLET ORAL
COMMUNITY
Start: 2025-04-23

## 2025-05-05 RX ORDER — ONDANSETRON 4 MG/1
4 TABLET, ORALLY DISINTEGRATING ORAL EVERY 8 HOURS PRN
Qty: 20 TABLET | Refills: 0 | Status: SHIPPED | OUTPATIENT
Start: 2025-05-05

## 2025-05-05 RX ORDER — OMEPRAZOLE 20 MG/1
20 CAPSULE, DELAYED RELEASE ORAL DAILY
Qty: 30 CAPSULE | Refills: 3 | Status: SHIPPED | OUTPATIENT
Start: 2025-05-05

## 2025-05-05 RX ORDER — FUROSEMIDE 20 MG/1
1 TABLET ORAL DAILY
COMMUNITY
Start: 2025-03-27

## 2025-05-05 RX ORDER — OMEPRAZOLE 20 MG/1
CAPSULE, DELAYED RELEASE ORAL
COMMUNITY
Start: 2025-04-30 | End: 2025-05-05 | Stop reason: SDUPTHER

## 2025-05-05 RX ORDER — LOSARTAN POTASSIUM 50 MG/1
TABLET ORAL
COMMUNITY
Start: 2025-04-30